# Patient Record
Sex: FEMALE | HISPANIC OR LATINO | Employment: PART TIME | URBAN - METROPOLITAN AREA
[De-identification: names, ages, dates, MRNs, and addresses within clinical notes are randomized per-mention and may not be internally consistent; named-entity substitution may affect disease eponyms.]

---

## 2018-04-16 LAB — HBA1C MFR BLD HPLC: 5.9 %

## 2018-08-08 LAB — HBA1C MFR BLD HPLC: 5.1 %

## 2018-08-27 ENCOUNTER — HOSPITAL ENCOUNTER (EMERGENCY)
Facility: HOSPITAL | Age: 33
Discharge: HOME | End: 2018-08-27
Attending: EMERGENCY MEDICINE

## 2018-08-27 VITALS
TEMPERATURE: 97.3 F | OXYGEN SATURATION: 98 % | BODY MASS INDEX: 25.51 KG/M2 | SYSTOLIC BLOOD PRESSURE: 109 MMHG | HEART RATE: 83 BPM | WEIGHT: 135 LBS | DIASTOLIC BLOOD PRESSURE: 56 MMHG | RESPIRATION RATE: 20 BRPM

## 2018-08-27 DIAGNOSIS — J02.9 PHARYNGITIS: Primary | ICD-10-CM

## 2018-08-27 LAB
ANION GAP SERPL CALCULATED.3IONS-SCNC: 9 MMOL/L (ref 4–13)
BASOPHILS # BLD AUTO: 0.02 THOUSANDS/ΜL (ref 0–0.1)
BASOPHILS NFR BLD AUTO: 0 % (ref 0–1)
BUN SERPL-MCNC: 12 MG/DL (ref 5–25)
CALCIUM SERPL-MCNC: 9 MG/DL (ref 8.3–10.1)
CHLORIDE SERPL-SCNC: 106 MMOL/L (ref 100–108)
CO2 SERPL-SCNC: 25 MMOL/L (ref 21–32)
CREAT SERPL-MCNC: 0.53 MG/DL (ref 0.6–1.3)
EOSINOPHIL # BLD AUTO: 0.04 THOUSAND/ΜL (ref 0–0.61)
EOSINOPHIL NFR BLD AUTO: 1 % (ref 0–6)
ERYTHROCYTE [DISTWIDTH] IN BLOOD BY AUTOMATED COUNT: 12.1 % (ref 11.6–15.1)
EXT PREG TEST URINE: NEGATIVE
GFR SERPL CREATININE-BSD FRML MDRD: 126 ML/MIN/1.73SQ M
GLUCOSE SERPL-MCNC: 118 MG/DL (ref 65–140)
HCT VFR BLD AUTO: 42.2 % (ref 34.8–46.1)
HGB BLD-MCNC: 13.7 G/DL (ref 11.5–15.4)
IMM GRANULOCYTES # BLD AUTO: 0.03 THOUSAND/UL (ref 0–0.2)
IMM GRANULOCYTES NFR BLD AUTO: 0 % (ref 0–2)
LYMPHOCYTES # BLD AUTO: 1.07 THOUSANDS/ΜL (ref 0.6–4.47)
LYMPHOCYTES NFR BLD AUTO: 12 % (ref 14–44)
MCH RBC QN AUTO: 29.2 PG (ref 26.8–34.3)
MCHC RBC AUTO-ENTMCNC: 32.5 G/DL (ref 31.4–37.4)
MCV RBC AUTO: 90 FL (ref 82–98)
MONOCYTES # BLD AUTO: 0.8 THOUSAND/ΜL (ref 0.17–1.22)
MONOCYTES NFR BLD AUTO: 9 % (ref 4–12)
NEUTROPHILS # BLD AUTO: 6.7 THOUSANDS/ΜL (ref 1.85–7.62)
NEUTS SEG NFR BLD AUTO: 78 % (ref 43–75)
NRBC BLD AUTO-RTO: 0 /100 WBCS
PLATELET # BLD AUTO: 254 THOUSANDS/UL (ref 149–390)
PMV BLD AUTO: 9 FL (ref 8.9–12.7)
POTASSIUM SERPL-SCNC: 4 MMOL/L (ref 3.5–5.3)
RBC # BLD AUTO: 4.69 MILLION/UL (ref 3.81–5.12)
SODIUM SERPL-SCNC: 140 MMOL/L (ref 136–145)
T3 SERPL-MCNC: 0.9 NG/ML (ref 0.6–1.8)
T4 FREE SERPL-MCNC: 0.92 NG/DL (ref 0.76–1.46)
TSH SERPL DL<=0.05 MIU/L-ACNC: 1.41 UIU/ML (ref 0.36–3.74)
WBC # BLD AUTO: 8.66 THOUSAND/UL (ref 4.31–10.16)

## 2018-08-27 PROCEDURE — 96361 HYDRATE IV INFUSION ADD-ON: CPT

## 2018-08-27 PROCEDURE — 84480 ASSAY TRIIODOTHYRONINE (T3): CPT | Performed by: EMERGENCY MEDICINE

## 2018-08-27 PROCEDURE — 85025 COMPLETE CBC W/AUTO DIFF WBC: CPT | Performed by: EMERGENCY MEDICINE

## 2018-08-27 PROCEDURE — 84443 ASSAY THYROID STIM HORMONE: CPT | Performed by: EMERGENCY MEDICINE

## 2018-08-27 PROCEDURE — 96374 THER/PROPH/DIAG INJ IV PUSH: CPT

## 2018-08-27 PROCEDURE — 99283 EMERGENCY DEPT VISIT LOW MDM: CPT

## 2018-08-27 PROCEDURE — 94640 AIRWAY INHALATION TREATMENT: CPT

## 2018-08-27 PROCEDURE — 84439 ASSAY OF FREE THYROXINE: CPT | Performed by: EMERGENCY MEDICINE

## 2018-08-27 PROCEDURE — 36415 COLL VENOUS BLD VENIPUNCTURE: CPT | Performed by: EMERGENCY MEDICINE

## 2018-08-27 PROCEDURE — 81025 URINE PREGNANCY TEST: CPT | Performed by: EMERGENCY MEDICINE

## 2018-08-27 PROCEDURE — 80048 BASIC METABOLIC PNL TOTAL CA: CPT | Performed by: EMERGENCY MEDICINE

## 2018-08-27 RX ORDER — IBUPROFEN 600 MG/1
600 TABLET ORAL EVERY 6 HOURS PRN
Qty: 28 TABLET | Refills: 0 | Status: SHIPPED | OUTPATIENT
Start: 2018-08-27 | End: 2018-11-20

## 2018-08-27 RX ORDER — IPRATROPIUM BROMIDE AND ALBUTEROL SULFATE 2.5; .5 MG/3ML; MG/3ML
3 SOLUTION RESPIRATORY (INHALATION) ONCE
Status: COMPLETED | OUTPATIENT
Start: 2018-08-27 | End: 2018-08-27

## 2018-08-27 RX ORDER — KETOROLAC TROMETHAMINE 30 MG/ML
15 INJECTION, SOLUTION INTRAMUSCULAR; INTRAVENOUS ONCE
Status: COMPLETED | OUTPATIENT
Start: 2018-08-27 | End: 2018-08-27

## 2018-08-27 RX ADMIN — SODIUM CHLORIDE 1000 ML: 0.9 INJECTION, SOLUTION INTRAVENOUS at 06:33

## 2018-08-27 RX ADMIN — IPRATROPIUM BROMIDE AND ALBUTEROL SULFATE 3 ML: 2.5; .5 SOLUTION RESPIRATORY (INHALATION) at 06:55

## 2018-08-27 RX ADMIN — KETOROLAC TROMETHAMINE 15 MG: 30 INJECTION, SOLUTION INTRAMUSCULAR at 06:55

## 2018-08-27 NOTE — ED PROVIDER NOTES
History  Chief Complaint   Patient presents with    Sore Throat     Sore throat for 3-4 days  States they are scared because she had thyroid surgery 3-4 months ago at [facility] (states it was thought to be cancer, but was not)      This 36-year-old female complains of progressively worsening sore throat past 4 days  She has subjective fevers  It hurts her to swallow and to take deep breaths  This morning it is much worse  She has had cough productive of yellow and green sputum  There has been no GI or  symptoms  No rash  No trauma  Patient is status post thyroid surgery 4 months ago  None       Past Medical History:   Diagnosis Date    No known problems        Past Surgical History:   Procedure Laterality Date    THYROID SURGERY         No family history on file  I have reviewed and agree with the history as documented  Social History   Substance Use Topics    Smoking status: Never Smoker    Smokeless tobacco: Never Used    Alcohol use No        Review of Systems   Constitutional: Negative  HENT: Positive for sore throat and trouble swallowing  Negative for congestion, ear discharge, ear pain, facial swelling, hearing loss, postnasal drip, rhinorrhea, sinus pain, sinus pressure, tinnitus and voice change  Eyes: Negative  Respiratory: Positive for cough and shortness of breath  Cardiovascular: Negative  Gastrointestinal: Negative  Endocrine: Negative  Genitourinary: Negative  Musculoskeletal: Negative  Skin: Negative  Allergic/Immunologic: Negative  Neurological: Negative  Hematological: Negative  Psychiatric/Behavioral: Negative  All other systems reviewed and are negative  Physical Exam  Physical Exam   Constitutional: She is oriented to person, place, and time  She appears well-developed and well-nourished  She appears distressed ( mild)  HENT:   Head: Normocephalic and atraumatic     Right Ear: External ear normal    Left Ear: External ear normal    Nose: Nose normal    Mouth/Throat: Oropharynx is clear and moist    Eyes: Conjunctivae and EOM are normal  Pupils are equal, round, and reactive to light  Neck: Normal range of motion  Neck supple  No JVD present  The appears to be some fullness overlying the thyroid gland  There is tenderness overlying the thyroid  No erythema, induration or fluctuance  Cardiovascular: Normal rate and regular rhythm  No murmur heard  Pulmonary/Chest: Effort normal and breath sounds normal  No stridor  Abdominal: Soft  Bowel sounds are normal    Musculoskeletal: Normal range of motion  She exhibits no edema  Neurological: She is alert and oriented to person, place, and time  She has normal reflexes  No cranial nerve deficit  Coordination normal    Skin: Skin is warm and dry  No rash noted  She is not diaphoretic  Psychiatric:   Anxious   Nursing note and vitals reviewed        Vital Signs  ED Triage Vitals   Temperature Pulse Respirations Blood Pressure SpO2   08/27/18 0542 08/27/18 0542 08/27/18 0542 08/27/18 0542 08/27/18 0542   (!) 97 3 °F (36 3 °C) 69 15 110/68 100 %      Temp src Heart Rate Source Patient Position - Orthostatic VS BP Location FiO2 (%)   -- 08/27/18 0542 08/27/18 0715 08/27/18 0542 --    Monitor Lying Right arm       Pain Score       08/27/18 0542       7           Vitals:    08/27/18 0700 08/27/18 0715 08/27/18 0730 08/27/18 0745   BP: 94/53 110/57 111/58 109/56   Pulse: 66 73 79 83   Patient Position - Orthostatic VS:  Lying Lying Lying       Visual Acuity      ED Medications  Medications   sodium chloride 0 9 % bolus 1,000 mL (0 mL Intravenous Stopped 8/27/18 0755)   ipratropium-albuterol (DUO-NEB) 0 5-2 5 mg/3 mL inhalation solution 3 mL (3 mL Nebulization Given 8/27/18 0655)   ketorolac (TORADOL) injection 15 mg (15 mg Intravenous Given 8/27/18 0655)       Diagnostic Studies  Results Reviewed     Procedure Component Value Units Date/Time    T3 [[id number]]  (Normal) Collected:  08/27/18 0521    Lab Status:  Final result Specimen:  Blood from Arm, Left Updated:  08/27/18 1129     T3, Total 0 90 ng/mL     T4, free [[id number]]  (Normal) Collected:  08/27/18 6912    Lab Status:  Final result Specimen:  Blood from Arm, Left Updated:  08/27/18 1116     Free T4 0 92 ng/dL     Basic metabolic panel [[id number]]  (Abnormal) Collected:  08/27/18 1459    Lab Status:  Final result Specimen:  Blood from Arm, Left Updated:  08/27/18 0736     Sodium 140 mmol/L      Potassium 4 0 mmol/L      Chloride 106 mmol/L      CO2 25 mmol/L      ANION GAP 9 mmol/L      BUN 12 mg/dL      Creatinine 0 53 (L) mg/dL      Glucose 118 mg/dL      Calcium 9 0 mg/dL      eGFR 126 ml/min/1 73sq m     Narrative:         National Kidney Disease Education Program recommendations are as follows:  GFR calculation is accurate only with a steady state creatinine  Chronic Kidney disease less than 60 ml/min/1 73 sq  meters  Kidney failure less than 15 ml/min/1 73 sq  meters  TSH [[id number]]  (Normal) Collected:  08/27/18 9952    Lab Status:  Final result Specimen:  Blood from Arm, Left Updated:  08/27/18 0717     TSH 3RD GENERATON 1 407 uIU/mL     Narrative:         Patients undergoing fluorescein dye angiography may retain small amounts of fluorescein in the body for 48-72 hours post procedure  Samples containing fluorescein can produce falsely depressed TSH values  If the patient had this procedure,a specimen should be resubmitted post fluorescein clearance            The recommended reference ranges for TSH during pregnancy are as follows:  First trimester 0 1 to 2 5 uIU/mL  Second trimester  0 2 to 3 0 uIU/mL  Third trimester 0 3 to 3 0 uIU/m      CBC and differential [[id number]]  (Abnormal) Collected:  08/27/18 0632    Lab Status:  Final result Specimen:  Blood from Arm, Left Updated:  08/27/18 0644     WBC 8 66 Thousand/uL      RBC 4 69 Million/uL      Hemoglobin 13 7 g/dL      Hematocrit 42 2 %      MCV 90 fL      MCH 29 2 pg      MCHC 32 5 g/dL      RDW 12 1 %      MPV 9 0 fL      Platelets 761 Thousands/uL      nRBC 0 /100 WBCs      Neutrophils Relative 78 (H) %      Immat GRANS % 0 %      Lymphocytes Relative 12 (L) %      Monocytes Relative 9 %      Eosinophils Relative 1 %      Basophils Relative 0 %      Neutrophils Absolute 6 70 Thousands/µL      Immature Grans Absolute 0 03 Thousand/uL      Lymphocytes Absolute 1 07 Thousands/µL      Monocytes Absolute 0 80 Thousand/µL      Eosinophils Absolute 0 04 Thousand/µL      Basophils Absolute 0 02 Thousands/µL     POCT pregnancy, urine [[id number]]  (Normal) Resulted:  08/27/18 0627    Lab Status:  Final result Updated:  08/27/18 0627     EXT PREG TEST UR (Ref: Negative) Negative                 No orders to display              Procedures  Procedures       Phone Contacts  ED Phone Contact    ED Course   Signed out to Dr Pat Hayes at 7 AM                             MDM  CritCare Time    Disposition  Final diagnoses:   Pharyngitis     Time reflects when diagnosis was documented in both MDM as applicable and the Disposition within this note     Time User Action Codes Description Comment    8/27/2018  7:36 AM Radha Wilson [J02 9] Pharyngitis       ED Disposition     ED Disposition Condition Comment    Discharge  Bassam Dwight discharge to home/self care      Condition at discharge: Good        Follow-up Information     Follow up With Specialties Details Why Contact Info Additional [address] [facility] Emergency Department Emergency Medicine  As needed, If symptoms worsen [address] ED, [address], [address] [facility]  Call in 1 day to get a family doctor            Discharge Medication List as of 8/27/2018  7:37 AM      START taking these medications    Details   ibuprofen (MOTRIN) 600 mg tablet Take 1 tablet (600 mg total) by mouth every 6 (six) hours as needed for mild pain for up to 7 days, Starting Mon 8/27/2018, Until Mon 9/3/2018, Print           No discharge procedures on file      ED Provider  Electronically Signed by           Farida Hoskins DO  08/29/18 7388

## 2018-08-27 NOTE — ED CARE HANDOFF
Emergency Department Sign Out Note        Sign out and transfer of care from [facility]  See Separate Emergency Department note  The patient, Marin Ferrari, was evaluated by the previous provider for sore throat  Workup Completed:  Patient examined  Waiting for blood work    ED Course / Workup Pending (followup):   waiting for blood work     patient has sore throat  Four days  Cough  No chest pain or shortness of breath  No nausea or vomiting  No fevers  Does have pharyngeal erythema  History of thyroid biopsy 2 months ago  Told it is normal   No erythema or swelling noted in neck  Airway patent  No trismus  Tolerating secretions  Pain improved with Toradol  Procedures  MDM  CritCare Time      Disposition  Final diagnoses:   Pharyngitis     Time reflects when diagnosis was documented in both MDM as applicable and the Disposition within this note     Time User Action Codes Description Comment    8/27/2018  7:36 AM Wray Carrel, Ara Add [J02 9] Pharyngitis       ED Disposition     ED Disposition Condition Comment    Discharge  Marin Ferrari discharge to home/self care  Condition at discharge: Good        Follow-up Information     Follow up With Specialties Details Why Contact Info Additional [address] [facility] Emergency Department Emergency Medicine  As needed, If symptoms worsen 450 [facility]  [address] [place] [address] [address] ED, [facility], [facility], [address] [facility], [address] [facility]  Call in 1 day to get a family doctor          Discharge Medication List as of 8/27/2018  7:37 AM      START taking these medications    Details   ibuprofen (MOTRIN) 600 mg tablet Take 1 tablet (600 mg total) by mouth every 6 (six) hours as needed for mild pain for up to 7 days, Starting Mon 8/27/2018, Until Mon 9/3/2018, Print           No discharge procedures on file         ED Provider  Electronically Signed by     Armando Pina MD  08/28/18 2687

## 2018-08-27 NOTE — DISCHARGE INSTRUCTIONS

## 2018-08-28 ENCOUNTER — APPOINTMENT (EMERGENCY)
Dept: RADIOLOGY | Facility: HOSPITAL | Age: 33
End: 2018-08-28

## 2018-08-28 ENCOUNTER — HOSPITAL ENCOUNTER (EMERGENCY)
Facility: HOSPITAL | Age: 33
Discharge: HOME | End: 2018-08-28
Attending: EMERGENCY MEDICINE | Admitting: EMERGENCY MEDICINE

## 2018-08-28 VITALS
BODY MASS INDEX: 23.6 KG/M2 | SYSTOLIC BLOOD PRESSURE: 115 MMHG | HEART RATE: 78 BPM | RESPIRATION RATE: 19 BRPM | TEMPERATURE: 97.1 F | DIASTOLIC BLOOD PRESSURE: 55 MMHG | WEIGHT: 125 LBS | OXYGEN SATURATION: 98 % | HEIGHT: 61 IN

## 2018-08-28 DIAGNOSIS — K12.0 CANKER SORES ORAL: ICD-10-CM

## 2018-08-28 DIAGNOSIS — J02.9 PHARYNGITIS: Primary | ICD-10-CM

## 2018-08-28 DIAGNOSIS — R05.9 COUGH: ICD-10-CM

## 2018-08-28 PROCEDURE — 96372 THER/PROPH/DIAG INJ SC/IM: CPT

## 2018-08-28 PROCEDURE — 71046 X-RAY EXAM CHEST 2 VIEWS: CPT

## 2018-08-28 PROCEDURE — 99283 EMERGENCY DEPT VISIT LOW MDM: CPT

## 2018-08-28 PROCEDURE — 70360 X-RAY EXAM OF NECK: CPT

## 2018-08-28 RX ORDER — KETOROLAC TROMETHAMINE 30 MG/ML
15 INJECTION, SOLUTION INTRAMUSCULAR; INTRAVENOUS ONCE
Status: COMPLETED | OUTPATIENT
Start: 2018-08-28 | End: 2018-08-28

## 2018-08-28 RX ADMIN — KETOROLAC TROMETHAMINE 15 MG: 30 INJECTION, SOLUTION INTRAMUSCULAR; INTRAVENOUS at 04:11

## 2018-08-28 RX ADMIN — DEXAMETHASONE SODIUM PHOSPHATE 10 MG: 10 INJECTION, SOLUTION INTRAMUSCULAR; INTRAVENOUS at 04:12

## 2018-08-28 NOTE — DISCHARGE INSTRUCTIONS
Úlceras aftosas   LO QUE NECESITA SABER:   Úlceras aftosas o aftas son pequeñas úlceras que crecen dentro de delaney boca  Ulceras son heridas abiertas que pueden ser superficiales o profundas  Usted puede tener nicholas o mas aftas al [facility], y estas pueden crecer en grupos  INSTRUCCIONES SOBRE EL LEAH HOSPITALARIA:   Busque atención médica de inmediato si:   · Si usted no puede comer o beber por el dolor en delaney boca  Pregúntele a delaney Radha Punch vitaminas y minerales son adecuados para usted  · Queta úlceras aftosas no anguiano desaparecido después de 3 a 4 semanas  · Delaney dolor no desaparece después de lisette medicamentos  · Queta úlceras empeoran o usted esta desarrollando mas úlceras, aun después del tratamiento  · Usted tiene preguntas o inquietudes acerca de delaney condición o cuidado  Medicamentos:  Es posible que usted necesite alguno de los siguientes:  · Los analgésicos  pueden administrarse en crema, gel o enjuague bucal     · Medicamentos esteroideos  pueden administrarse para reducir el enrojecimiento, el dolor y la hinchazón  · [place] queta medicamentos russell se le haya indicado  Consulte con delaney médico si usted tavon que delaney medicamento no le está ayudando o si presenta efectos secundarios  Infórmele si es alérgico a cualquier medicamento  Mantenga nicholas lista actualizada de los [place], las vitaminas y los productos herbales que la nena  Incluya los siguientes datos de los medicamentos: cantidad, frecuencia y motivo de administración  Traiga con usted la lista o los envases de la píldoras a queta citas de seguimiento  Lleve la lista de los medicamentos con usted en adam de nicholas emergencia  Acuda a queta consultas de control con delaney médico según le indicaron  Anote queta preguntas para que se acuerde de hacerlas ariel queta visitas  Coma alimentos blandos y simples hasta que sanen las úlceras aftosas  Por ejemplo, yogur, huevos y sopas cremosas  Puede que necesite cambiar algunos alimentos que normalmente come  No coma alimentos crujientes, secos o salados, tales russell pan herson seco, palomitas de maíz o chips  Estos pueden causarle dolor  No consuma alimentos o bebidas que tengan cítricos russell toronjas, jugo de [place], jennifer y shelia  Estos alimentos pueden empeorar el dolor o causar la formación de [place]  Cuidado bucal:  cuidadosamente cepille queta dientes y gilbert lengua todos los días  Use un cepillo de dientes suave  Si usted tiene dentaduras, [employer]  Si queta lanie o dentaduras no se sienten cómodos, jocelyn que Brian Christy revise para asegurarse que tengan el ajuste correcto  © 2017 2600 Justin Gardiner Information is for End User's use only and may not be sold, redistributed or otherwise used for commercial purposes  All illustrations and images included in CareNotes® are the copyrighted property of A D A M , Inc  or Chris Edge  Esta información es sólo para uso en educación  Gilbert intención no es darle un consejo médico sobre enfermedades o tratamientos  Colsulte con gilbert [place] farmacéutico antes de seguir cualquier régimen médico para saber si es seguro y efectivo para usted  Tos aguda   LO QUE NECESITA SABER:   Fern Raphael tos aguda puede durar hasta 3 semanas  Las causas comunes de nicholas tos aguda incluyen un resfriado, alergias o nicholas infección pulmonar  INSTRUCCIONES SOBRE EL LEAH HOSPITALARIA:   Regrese a la espinoza de emergencias si:   · Tiene dificultad para respirar o le falta el aire  · Usted tose nahomi o nota nahomi en gilbert mucosidad  · Se desmaya, se siente débil o mareado  · Le duele el pecho cuando respira hondo o tose  · Tiene respiración silbante  Pregúntele a gilbert Melissa Byes vitaminas y minerales son adecuados para usted  · Usted tiene fiebre  · La tos dura más de 4 semanas  · Queta síntomas no mejoran con el tratamiento  · Usted tiene preguntas o inquietudes acerca de gilbert condición o cuidado    Medicamentos:   · Medicamentos, para detener la tos, disminuir la inflamación de las vías respiratorias o ayudar a abrirlas  Los medicamentos también pueden despejar las vías respiratorias  Pregunte a delaney médico qué medicamentos de venta tez puede lisette  Si tiene nicholas infección causada por bacterias, es posible que necesite antibióticos  · [place] randall medicamentos russell se le haya indicado  Consulte con delaney médico si usted tavon que delaney medicamento no le está ayudando o si presenta efectos secundarios  Infórmele si es alérgico a cualquier medicamento  Mantenga nicholas lista actualizada de los [employer], las vitaminas y los productos herbales que la nena  Incluya los siguientes datos de los medicamentos: cantidad, frecuencia y motivo de administración  Traiga con usted la lista o los envases de la píldoras a randall citas de seguimiento  Lleve la lista de los medicamentos con usted en adam de nicholas emergencia  El manejo de delaney síntomas:   · No fume y permanezca lejos de otras personas que fuman  La nicotina y otros químicos contenidos en los cigarrillos y puros pueden causar daño pulmonar y empeorar delaney tos  Pida información a delaney médico si usted actualmente fuma y necesita ayuda para dejar de fumar  Los cigarrillos electrónicos o tabaco sin humo todavía contienen nicotina  Consulte con delaney médico antes de [employer]  · [place] líquidos adicionales russell se le indique  Los líquidos le ayudarán a aflojar y disolver la mucosidad para que la pueda expectorar  Los líquidos ayudarán a evitar la deshidratación  Los mejores líquidos para lisette son el agua, el jugo y el caldo  No tome líquidos que contienen cafeína  La cafeína puede aumentar el riesgo de deshidratación  Pregúntele a delaney médico cuál es la cantidad de líquido que necesita ingerir a diario  · Repose según le indicaron  No realice actividades que empeoren la tos, russell el ejercicio físico      · Use un humidificador o vaporizador    Use un humidificador de isabelle frío o un vaporizador para elevar la humedad en gilbert casa  [place] podría ayudarle a respirar más fácilmente y al mismo tiempo disminuir la tos  · Ingiera de 2 a 5 ml de [employer] al día  La miel puede ayudar a diluir los mocos y Vill[employer] tos  · Utilice gotas o pastillas para la tos  Estas pueden ayudar a disminuir la irritación de la garganta y la tos  Acuda a randall consultas de control con gilbert médico según le indicaron  Anote randall preguntas para que se acuerde de hacerlas ariel randall visitas  © 2017 2600 Justin Gardiner Information is for End User's use only and may not be sold, redistributed or otherwise used for commercial purposes  All illustrations and images included in CareNotes® are the copyrighted property of A D A M , Inc  or Chris Edge  Esta información es sólo para uso en educación  Gilbert intención no es darle un consejo médico sobre enfermedades o tratamientos  Colsulte con gilbert [place] farmacéutico antes de seguir cualquier régimen médico para saber si es seguro y efectivo para usted  Faringitis   LO QUE USTED DEBE SABER:   La faringitis es la inflamación de la parte de adentro de la garganta, conocida russell faringe  La faringitis podría causar dolor de garganta o irritación cuando usted traga y es generalmente causada por un resfriado o el virus de la gripe  Podría también ser causada por bacterias, russell por ejemplo la bacteria del estreptococo         DESPUÉS DE SER DADO DE LEAH:   Medicamentos:  Gilbert dolor de garganta se aliviará en aproximadamente de 3 a 5 días si no la nena tratamiento siempre y cuando un virus lo haya causado  Usted podría necesitar de lo siguiente:  · Antibióticos:  Los antibióticos ayudan a combatir o prevenir infecciones causadas por bacterias  No suspenda el uso de Waldemar a menos que gilbert médico de cabecera se lo indique, aunque ya se esté sintiendo mejor   Gilbert garganta se empezará a sentir mejor dentro de 5 días después de comenzar con el tratamiento de antibióticos  · Ibuprofeno o acetaminofeno:  Estos medicamentos disminuyen el dolor y la fiebre y se pueden comprar sin receta médica  Pregunte a delaney médico de cabecera cuáles medicamentos son mejores para usted y pregunte la cantidad y frecuencia para los mismos  · [place] randall medicamentos russell se le haya indicado  Llame a delaney proveedor de mynor si usted piensa que delaney medicamento no le está ayudando o si tiene efectos secundarios  Infórmele si es alérgico a cualquier medicamento  Mantenga nicholas lista vigente de los medicamentos, vitaminas, y hierbas que la nena  [address] cantidades, la frecuencia con que los la nena y por qué los la nena  Traiga la lista o los envases de randall medicamentos a las citas de seguimiento  Mantenga la lista consigo en adam de nicholas emergencia  Bote las listas [address]  Programe nicholas renato con delaney proveedor de [employer] se le haya indicado: Anote randall preguntas para que se acuerde de hacerlas ariel randall visitas  Cuidados personales:   · Jocelyn gárgaras de agua con sal:  Mezcle ¼ de cucharadita de sal en un vaso con agua tibia y jocelyn gárgaras  [place] podría ayudar a reducir la inflamación en delaney garganta  · Caitie Brazen líquidos:  Las bebidas frías o tibias podrían ayudarle a aliviar delaney dolor de garganta, además de que beber líquidos puede ayudar a prevenir la deshidratación  · Mantenga delaney habitación húmeda:  Use un humidificador de vapor frío para ayudar a humedecer el aire en delaney habitación y calmar delaney tos  · Alivie delaney garganta:  Las pastillas para la tos, el hielo y los alimentos blandos o helados de agua podrían ayudar a aliviar delaney garganta  · Descanse delaney garganta lo más que pueda:  Trate de no usar delaney voz  [place] podría irritar delaney garganta y empeorar randall síntomas  · Evite el contagio de gérmenes:  La faringitis se propaga de Carren Quince persona a otra con facilidad  Lávese las og con agua tibia y [place]  No comparta comidas ni bebidas   Estas prácticas ayudarán a prevenir el contagio de gérmenes  Comuníquese con delaney médico de cabecera si:   · Delaney dolor o irritación en la garganta Antolin Eric  · Queta síntomas no mejoran después de 5 días  · Tiene preguntas o inquietudes sobre delaney condición o cuidados  Busque ayuda inmediata o llame al 911 si:   · Usted tiene dificultad para respirar o tragar porque delaney garganta está inflamada o adolorida  · Usted está babeando porque le duele demasiado tragar  · Usted tiene un bulto doloroso en la garganta que no se le callie después de 5 días  · Usted tiene fiebre de [place] de 102? F (39?C) o le dura más de 3 días  [place] podría ser un signo de nicholas infección aún más seria  · Usted se siente confundido  · Usted tiene nahomi en la garganta u [place]  © 2014 3801 Radha Mackeye is for End User's use only and may not be sold, redistributed or otherwise used for commercial purposes  All illustrations and images included in CareNotes® are the copyrighted property of A D A M , Inc  or Chris Edge  Esta información es sólo para uso en educación  Delaney intención no es darle un consejo médico sobre enfermedades o tratamientos  Colsulte con delaney Yaima Boers farmacéutico antes de seguir cualquier régimen médico para saber si es seguro y efectivo para usted

## 2018-08-28 NOTE — ED PROVIDER NOTES
History  Chief Complaint   Patient presents with    Sore Throat     PT presents to the er with a sore throat Pt  Pt states that since being seen here yesterday that ehr pain has gotten worse and thta she can not swallow  Pt seen here yesterday for same (nonproductive cough x 4 days), sore throat, ulcer in mouth  No fevers, dyspnea or CP  No rashes  No abd pain/N/V  No urinary complaints  No recent travel  Recent thyroid surgery - no CA  History provided by:  Patient and significant other   used: Yes    Sore Throat   Location:  Generalized  Quality:  Aching  Severity:  Moderate  Onset quality:  Gradual  Duration:  4 days  Timing:  Constant  Progression:  Unchanged  Chronicity:  New  Relieved by:  Nothing  Worsened by:  Swallowing  Ineffective treatments:  NSAIDs  Associated symptoms: adenopathy, cough and trouble swallowing    Associated symptoms: no abdominal pain, no chest pain, no chills, no drooling, no ear pain, no fever, no headaches, no neck stiffness, no rash, no rhinorrhea, no shortness of breath, no sinus congestion, no stridor and no voice change    Risk factors: recent ENT procedure        Prior to Admission Medications   Prescriptions Last Dose Informant Patient Reported? Taking?   ibuprofen (MOTRIN) 600 mg tablet   No No   Sig: Take 1 tablet (600 mg total) by mouth every 6 (six) hours as needed for mild pain for up to 7 days      Facility-Administered Medications: None       Past Medical History:   Diagnosis Date    No known problems        Past Surgical History:   Procedure Laterality Date    THYROID SURGERY         History reviewed  No pertinent family history  I have reviewed and agree with the history as documented  Social History   Substance Use Topics    Smoking status: Never Smoker    Smokeless tobacco: Never Used    Alcohol use No        Review of Systems   Constitutional: Negative for appetite change, chills, fatigue and fever     HENT: Positive for mouth sores, sore throat and trouble swallowing  Negative for congestion, drooling, ear pain, rhinorrhea and voice change  Eyes: Negative for pain and visual disturbance  Respiratory: Positive for cough  Negative for chest tightness, shortness of breath and stridor  Cardiovascular: Negative for chest pain, palpitations and leg swelling  Gastrointestinal: Negative for abdominal pain, blood in stool, constipation, diarrhea, nausea and vomiting  Genitourinary: Negative for difficulty urinating and hematuria  Musculoskeletal: Negative for back pain, neck pain and neck stiffness  Skin: Negative for rash  Neurological: Negative for dizziness, syncope, speech difficulty, light-headedness and headaches  Hematological: Positive for adenopathy  Psychiatric/Behavioral: Negative for confusion and suicidal ideas  Physical Exam  Physical Exam   Constitutional: She is oriented to person, place, and time  She appears well-developed and well-nourished  No distress  HENT:   Head: Normocephalic and atraumatic  Right Ear: External ear normal    Left Ear: External ear normal    Nose: Nose normal    Mouth/Throat: Uvula is midline and mucous membranes are normal  Oral lesions (apthous ulcer on bottom gums) present  No trismus in the jaw  No dental abscesses or uvula swelling  Posterior oropharyngeal erythema (mild) present  No oropharyngeal exudate, posterior oropharyngeal edema or tonsillar abscesses  No tonsillar exudate  Eyes: Conjunctivae and EOM are normal  Pupils are equal, round, and reactive to light  Right eye exhibits no discharge  Left eye exhibits no discharge  No scleral icterus  Neck: Normal range of motion  Neck supple  No tracheal deviation present  Thyromegaly (with mild tenderness  no erythema  incision well healed) present  Cardiovascular: Normal rate, regular rhythm, normal heart sounds and intact distal pulses  Exam reveals no gallop and no friction rub      No murmur heard   Pulmonary/Chest: Effort normal and breath sounds normal  No stridor  No respiratory distress  She exhibits no tenderness  Abdominal: Soft  Bowel sounds are normal  There is no tenderness  There is no rebound and no guarding  Musculoskeletal: Normal range of motion  She exhibits no edema or deformity  Lymphadenopathy:     She has no cervical adenopathy  Neurological: She is alert and oriented to person, place, and time  No cranial nerve deficit or sensory deficit  Coordination normal    Skin: Skin is warm and dry  No rash noted  She is not diaphoretic  Psychiatric: She has a normal mood and affect  Her behavior is normal    Nursing note and vitals reviewed  Vital Signs  ED Triage Vitals   Temperature Pulse Respirations Blood Pressure SpO2   08/28/18 0323 08/28/18 0323 08/28/18 0323 08/28/18 0323 08/28/18 0323   (!) 97 1 °F (36 2 °C) 78 19 115/55 98 %      Temp Source Heart Rate Source Patient Position - Orthostatic VS BP Location FiO2 (%)   08/28/18 0323 08/28/18 0323 08/28/18 0323 08/28/18 0323 --   Tympanic Monitor Sitting Right arm       Pain Score       08/28/18 0411       9           Vitals:    08/28/18 0323   BP: 115/55   Pulse: 78   Patient Position - Orthostatic VS: Sitting       Visual Acuity      ED Medications  Medications   dexamethasone 10 mg/mL oral liquid 10 mg 1 mL (10 mg Oral Given 8/28/18 0412)   ketorolac (TORADOL) injection 15 mg (15 mg Intramuscular Given 8/28/18 0411)       Diagnostic Studies  Results Reviewed     None                 XR neck soft tissue   ED Interpretation by Lyric Jasso MD (08/28 0456)   No acute findings  Normal prevertebral spaces  XR chest 2 views   ED Interpretation by Lyric Jasso MD (08/28 0456)   No consolidation, PTX, PNA or subq air                   Procedures  Procedures       Phone Contacts  ED Phone Contact    ED Course  ED Course as of Aug 28 0500   Tue Aug 28, 2018   0320 Neg preg test yesterday    0400 Pt in no distress  No stridor, tripoding or drooling  No voice change  Will check plain films, give pain meds  Suspect viral pharyngitis, but will r/o obstructive etiology from thyroid, PNA, pleural effusion, PTX, retropharyngeal abscess  [address] noted  Will d/c pt home, recommend f/u with PCP  If worsening, drooling, not tolerating liquids - RTED  MDM  Number of Diagnoses or Management Options  Canker sores oral: new and does not require workup  Cough: new and requires workup  Pharyngitis: established and worsening     Amount and/or Complexity of Data Reviewed  Tests in the radiology section of CPT®: ordered and reviewed  Review and summarize past medical records: yes  Independent visualization of images, tracings, or specimens: yes    Risk of Complications, Morbidity, and/or Mortality  Presenting problems: moderate  Diagnostic procedures: low  Management options: low    Patient Progress  Patient progress: improved    CritCare Time    Disposition  Final diagnoses:   Pharyngitis   Cough   Canker sores oral     Time reflects when diagnosis was documented in both MDM as applicable and the Disposition within this note     Time User Action Codes Description Comment    8/28/2018  4:57 AM Rc Li S Add [J02 9] Pharyngitis     8/28/2018  4:57 AM Rc Li S Add [R05] Cough     8/28/2018  4:59 AM Alcides Botello Add [K12 0] Canker sores oral       ED Disposition     ED Disposition Condition Comment    Discharge  Unk Poag discharge to home/self care      Condition at discharge: Good        Follow-up Information     Follow up With Specialties Details Why Contact Info Additional Information    [facility] Emergency Department Emergency Medicine  If symptoms worsen [address] [facility]  [address] [place] [address] [address] ED, [address], [address]          Patient's Medications   Discharge Prescriptions    No medications on file     No discharge procedures on file      ED Provider  Electronically Signed by           Lyric Jasso MD  08/28/18 9073

## 2018-08-28 NOTE — PROGRESS NOTES
Called patient about cxr  She is Turkish speaking only, DR Garcia was able to communicate with her and advised her to start amoxicillin and zpak for pneumonia  I called in these scripts to Jonn Gardiner in [facility]

## 2018-11-06 ENCOUNTER — OFFICE VISIT (OUTPATIENT)
Dept: OBGYN CLINIC | Facility: HOSPITAL | Age: 33
End: 2018-11-06

## 2018-11-06 VITALS
SYSTOLIC BLOOD PRESSURE: 114 MMHG | DIASTOLIC BLOOD PRESSURE: 71 MMHG | HEART RATE: 66 BPM | BODY MASS INDEX: 27.09 KG/M2 | WEIGHT: 138 LBS | HEIGHT: 60 IN

## 2018-11-06 DIAGNOSIS — N91.2 AMENORRHEA: Primary | ICD-10-CM

## 2018-11-06 PROCEDURE — 99203 OFFICE O/P NEW LOW 30 MIN: CPT | Performed by: OBSTETRICS & GYNECOLOGY

## 2018-11-06 NOTE — PROGRESS NOTES
Note on use of High Level Disinfection Process (Trophon) for transvaginal probe:   Tye Bell   REF: [id number]   SN: [id number]     [address] [place] [address] [place] #[id number]

## 2018-11-06 NOTE — PROGRESS NOTES
Assessment  29 y o  female at approximately 6wks presenting for amenorrhea  Pregnancy confirmed, dating inconsistent with LMP  MERCY will be confirmed at next US, given lack of fetal pole on this ultrasound  Plan  Diagnoses and all orders for this visit:    Amenorrhea  - Prenatal vitamin  - Prenatal Nursing Intake in 2 weeks  - Prenatal H&P in 4 weeks     ____________________________________________________________      Diya Meadows is a 29 y o  female with an LMP of 8/26/18 (10w2d by LMP) who presents for amenorrhea  She notes she took a home pregnancy test in October and it was positive  She is currently otherwise without complaint  Patient notes that this pregnancy was planned  She was not using contraception at the time  She reports she is certain of her LMP and that she has regular menses  She has had a little spotting x2 d at the end of September  Unsure if this was a regular menses  Patient's prior pregnancies were complicated by gestational diabetes  Objective  /71   Pulse 66   Ht 5' (1 524 m)   Wt 62 6 kg (138 lb)   BMI 26 95 kg/m²       Physical Exam:  Physical Exam   Constitutional: She appears well-developed and well-nourished  No distress  HENT:   Head: Normocephalic and atraumatic  Eyes: No scleral icterus  Pulmonary/Chest: Effort normal  No accessory muscle usage  No respiratory distress  Abdominal: She exhibits no distension  There is no tenderness  There is no rebound and no guarding  Skin: Skin is warm and dry  No rash noted  No erythema  Psychiatric: She has a normal mood and affect   Her behavior is normal        Transvaginal Pelvic Ultrasound  +IUP  Fetal pole not visualized  GS consistent with 5w5d (1 21 x 0 98 x 2 14cm)  +yolk sac  Fibroid noted on anterior uterine wall (5 25 x 5 17 x 3 52cm)

## 2018-11-12 LAB — HBA1C MFR BLD HPLC: 5.3 %

## 2018-11-20 ENCOUNTER — INITIAL PRENATAL (OUTPATIENT)
Dept: OBGYN CLINIC | Facility: HOSPITAL | Age: 33
End: 2018-11-20

## 2018-11-20 ENCOUNTER — PATIENT OUTREACH (OUTPATIENT)
Dept: OBGYN CLINIC | Facility: HOSPITAL | Age: 33
End: 2018-11-20

## 2018-11-20 VITALS
WEIGHT: 137.8 LBS | HEIGHT: 60 IN | SYSTOLIC BLOOD PRESSURE: 108 MMHG | HEART RATE: 72 BPM | BODY MASS INDEX: 27.05 KG/M2 | DIASTOLIC BLOOD PRESSURE: 74 MMHG

## 2018-11-20 DIAGNOSIS — Z34.91 PREGNANT AND NOT YET DELIVERED IN FIRST TRIMESTER: ICD-10-CM

## 2018-11-20 DIAGNOSIS — F41.9 ANXIETY DURING PREGNANCY IN FIRST TRIMESTER, ANTEPARTUM: ICD-10-CM

## 2018-11-20 DIAGNOSIS — Z86.59 HISTORY OF DEPRESSION: ICD-10-CM

## 2018-11-20 DIAGNOSIS — Z3A.01 LESS THAN 8 WEEKS GESTATION OF PREGNANCY: Primary | ICD-10-CM

## 2018-11-20 DIAGNOSIS — O21.9 NAUSEA AND VOMITING IN PREGNANCY: ICD-10-CM

## 2018-11-20 DIAGNOSIS — Z86.32 HISTORY OF GESTATIONAL DIABETES: ICD-10-CM

## 2018-11-20 DIAGNOSIS — O09.291 PRIOR PREGNANCY WITH FETAL DEMISE AND CURRENT PREGNANCY IN FIRST TRIMESTER: ICD-10-CM

## 2018-11-20 DIAGNOSIS — O99.341 ANXIETY DURING PREGNANCY IN FIRST TRIMESTER, ANTEPARTUM: ICD-10-CM

## 2018-11-20 PROCEDURE — 99211 OFF/OP EST MAY X REQ PHY/QHP: CPT

## 2018-11-20 NOTE — PATIENT INSTRUCTIONS
Señales ariel el embarazo: Cuando llamar    1  sangrado vaginal  2  Dolor abdominal ciara que no desaparece  3  Fiebre (más de 100 4 y no se brice con Tylenol)  4  Vómitos persistentes que tavarez más de 24 horas  5  dolor de pecho  6  Dolor o ardor al orinar  7  Dolor de patti severo que no se resuelve con Tylenol  8  Visión borrosa o mary anne puntos en delaney visión  9  Hinchazón repentina de delaney tyra o og  10  Enrojecimiento, hinchazón o dolor en nicholas pierna  11  Un aumento de peso repentino en pocos días  12  Contar los movimientos fetales del bebé  (después de 28 semanas o el sexto mes de embarazo)  15  Nicholas pérdida de líquido acuoso de la vagina: puede ser un chorro, un goteo o nicholas humedad continua  14  Después de 20 semanas de embarazo, calambres rítmicos (más de 4 por hora) o menstruales russell dolor Laura Furrow / [place] y Embarazo:  La siguiente lista de medicamentos de Nikko Gins generalmente se considera murray de lisette ariel el [facility]  Tenga cuidado de no duplicar los productos que contienen acetaminofén (Tylenol)  Resfriados / dolor de garganta   Robitussin DM - Simple (guaifenesina)   Aerosol nasal salino   Gárgaras de agua salada caliente   Cepacol pastillas para la garganta o enjuague bucal (cetilpiridinio)   Sucrets (hexylresoricinol)    Patience Couch LA D - o DESCONGESANTE   Claritin (loratadine)   Zrytec (cetirizina)   Allerga (fexofenadina)      Gracy de [place] / [place] y molestias:   Tylenol (paracetamol) (acetaminophen)  NO exceda más de 3000 mg de Tylenol en un período de 24 horas        Acidez   Mylanta (hidróxido de aluminio / simeticona, hidróxido de magnesio)   Maalaox (hidróxido de aluminio y [place], hidróxido de magnesio)   Tums (carbonato de calcio)   Riopan (magaldrate)      Estreñimiento   Colace (docusato de sodio)   Surfak (docusato de sodio)   MiraLAX   Supositorios de glicerina   Flota enema (fosfato de sodio y bifosfato de sodio)  Náuseas vómitos   Vitamina B6 (piridoxina): puede lisette 50 mg a la hora de acostarse, 25 mg por la mañana, 25 mg por la tarde   Unisom (doxilamina): se puede usar para las náuseas / vómitos (ayala nicholas tableta de 25 mg por la mitad)  Puede causar somnolencia  Dormir   Benadryl (difenhidramina): tome 1-2 tabletas según sea necesario antes de acostarse   Tableta Unisom (doxilamina) de 25 mg    Tableta de melatonina de 5 mg: según sea necesario antes de acostarse      En general, la forma genérica de la medicina suele ser más económica que la forma de zuleika del [employer]  Shelvy Mingle Un embarazo,   CUIDADO AMBULATORIO:   Lo qué necesita saber sobre el Grace Zina: Un embarazo normal dura alrededor de 40 semanas  El primer trimestre dura desde delaney último periodo hasta la semana 12 de Grace Zina  El hanny trimestre se extiende desde la semana 13 de delaney embarazo hasta la semana 23  El tercer trimestre se extiende desde la semana 24 de embarazo hasta que nazca delaney bebé  Si usted conoce la fecha de delaney último periodo, delaney médico puede calcular la fecha de nacimiento de delaney bebé  Es posible que usted de a derrick a delaney bebé en cualquier momento desde la semana 37 hasta 2 semanas después de la fecha calculada de Vane  Busque atención médica de inmediato si:   · Usted presenta un britt dolor de patti que no desaparece  · Usted tiene cambios en la visión nuevos o en aumento, russell visión borrosa o con manchas  · Usted tiene inflamación nueva o creciente en delaney tyra o og  · Usted tiene dolor o cólicos en el abdomen o la parte baja de la espalda  · Usted tiene sangrado vaginal   Comuníquese con delaney médico o obstreta si:   · Usted tiene calambres, presión o tensión abdominal     · Usted tiene un cambio en la secreción vaginal     · Usted no puede retener alimentos ni líquidos y está perdiendo [place]  · Usted tiene escalofríos o fiebre      · Usted tiene comezón, ardor o dolor vaginal      · Usted tiene Adrian Stain secreción vaginal amarillenta, verdosa, diana o de [employer]  · Usted tiene dolor o ardor al Luciano Life, orina menos de lo habitual o tiene [place] rosada o sanguinolenta  · Usted tiene preguntas o inquietudes acerca de delaney condición o cuidado  Cambios corporales que pueden ocurrir ariel delaney embarazo:   · Los cambios en los senos  que usted Otilio Havers sensibilidad y cosquilleo ariel la primera parte de delaney Carmie Macedo  Los senos se volverán más grandes  Es posible que necesite un sostén con soporte  Es posible que usted adria Fanny Tomas secreción delgada y PRASHANT, conocida russell calostro, que sale de randall pezones ariel el hanny trimestre  El calostro es un líquido que se convertirá en [place] alrededor de 3 días después de usted flori dado a derrick  · Cambios en la piel y estrías  podrían ocurrir ariel delaney embarazo  Es posible que usted tenga marcas cantrell, conocidas russell estrías, en delaney piel  Las [employer] se desvanecen después del Carmie Macedo  Utilice crema si delaney piel está seca y con comezón  La piel de delaney tyra, alrededor de los pezones y debajo de delaney ombligo podría oscurecerse  La mayoría del Maria Elena, delaney piel Claudette Jayda a delaney color normal después del nacimiento de delaney bebé  · El malestar matutino  consiste en náuseas y vómitos que pueden ocurrir en cualquier momento del día  Evite los alimentos grasosos y picantes  Coma comidas pequeñas ariel el día en vez de porciones grandes  El jengibre puede ayudar a [employer]  Consulte con delaney médico acerca de otras formas para disminuir las náuseas y el vómito  · Acidez estomacal  puede ser causada por los cambios hormonales ariel delaney Carmie Macedo  El [place] en crecimiento puede empujar delaney estómago hacia arriba y forzar ácido estomacal a acumularse dentro de delaney esófago  [place] o 5 comidas pequeñas cada día en vez de comidas grandes  Evite los alimentos picantes  Evite comer charlie antes de irse a la cama      · Estreñimiento  puede desarrollarse ariel delaney embarazo  Para tratar el estreñimiento, coma alimentos altos en fibra russell cereales con fibra, frijoles, frutas, verduras, panes integrales y [place]  Junnie Fuss de Daniella regular y tome suficiente agua  Es posible que delaney médico sugiera un suplemento con fibra para ablandar randall evacuaciones intestinales  Consulte con delaney médico antes de usar cualquier medicamento para disminuir el estreñimiento  · Las hemorroides  son Judeen Reason grandes en el área rectal  Pueden causar dolor, comezón y sangrado de color connell vivo en delaney recto  Para disminuir el riesgo de hemorroides, prevenga el estreñimiento y no se esfuerce cuando tenga nicholas evacuación intestinal  Si usted tiene hemorroides, sumérjase en nicholas bañera con agua tibia para aliviar la incomodidad  Consulte con delaney médico cómo puede tratar las hemorroides  · Los calambres y la hinchazón en las piernas  pueden ser causados por niveles bajos de calcio o por el peso adicional del [facility]  Eleve randall piernas por encima del nivel de delaney corazón para disminuir la hinchazón  Ariel un calambre en la pierna, estire o de un masaje al [employer] que tiene el calambre  El calor puede ayudar a disminuir el dolor y los espasmos musculares  Aplique calor sobre el músculo por 20 a 30 minutos cada 2 horas por la cantidad de días que se le indique  · Dolor en la espalda  puede ocurrir a medida que delaney bebé crece  No esté de pie por largos periodos de tiempo ni levante objetos pesados  Use nicholas buena postura mientras esté de pie, se agache o se doble  Use zapatos de tacón bajo con un buen soporte  Descansar puede también ayudarla a aliviar el dolor de espalda  Pregunte a delaney médico acerca de ejercicios que usted pueda hacer para fortalecer los músculos de delaney espalda  Manténgase saludable ariel delaney embarazo:   · Consuma alimentos saludables y variados    Alimentos saludables incluyen frutas, verduras, panes de ken integral, alimentos lácteos bajos en grasa, frijoles, Fabby Bronson y pescado  Louin líquidos russell se le haya indicado  Pregunte cuánto líquido debe lisette cada día y cuáles líquidos son los más adecuados para usted  Limite el consumo de cafeína a menos de [address]  Limite el consumo de pescado a 2 porciones cada semana  Escoja pescado con concentraciones bajas de maria dolores russell atún ligero enlatado, camarón, cangrejo, salmón, bacalao o tilapia  No  coma pescado con concentraciones altas de maria dolores russell pez kailee, caballa gigante, pargo rayado y tiburón  · [address] [address]  Delaney necesidad de ciertas vitaminas y [address], russell el ácido fólico, aumenta ariel el [facility]  Las vitaminas prenatales proporcionan algunas de las vitaminas y minerales adicionales que usted necesita  Las vitaminas prenatales también podrían ayudar a disminuir el riesgo de ciertos defectos de nacimiento  · Pregunte cuánto peso usted debe aumentar ariel delaney embarazo  Demasiado aumento de peso o muy poco puede ser poco saludable para usted y delaney bebé  · Consulte con delaney médico acerca de hacer ejercicio  El ejercicio moderado puede ayudarla a mantenerse en forma  Delaney médico la ayudará a planear un programa de ejercicios que sea seguro para usted ariel delaney [facility]  · No fume  Si usted fuma, nunca es demasiado tarde para dejar de hacerlo  Fumar aumenta el riesgo de aborto espontáneo y otros problemas de mnyor ariel edlaney [place]  Fumar puede causar que delaney bebé nazca antes de tiempo o que pese menos al nacer  Solicite información a delaney médico si usted necesita ayuda para dejar de fumar  · No consuma alcohol  El alcohol pasa de delaney cuerpo al bebé a través de la placenta  Puede afectar el desarrollo del cerebro de delaney bebé y provocar el síndrome de alcoholismo fetal (SAF)  FAS es un tarsha de condiciones que causan [address], de comportamiento y de crecimiento  · Consulte con delaney médico antes de lisette cualquier medicamento  Muchos medicamentos pueden perjudicar a gilbert bebé si usted los la nena [address]  No tome ningún medicamento, vitaminas, hierbas o suplementos sin leela consultar con gilbert Onel Sensor  use drogas ilegales o de la alexander (russell marihuana o cocaína) mientras está embarazada  Consejos de seguridad:   · Evite jacuzzis y saunas  No use un jacuzzi o un sauna mientras usted está embarazada, especialmente ariel el primer trimestre  Los [facility] y los saunas aumentan la temperatura de gilbert bebé y el riesgo de defectos de nacimiento  · Evite la toxoplasmosis  [place] es nicholas infección causada por comer carne cruda o estar cerca del excremento de un randell infectado  [place] puede causar malformaciones congénitas, aborto espontáneo y Sanket Schein  Lávese las og después de tocar carne cruda  Asegúrese de que la carne esté tessie cocida antes de comerla  Evite los huevos crudos y la Cleave Bone  Use guantes o pida que alguien la ayude a limpiar la caja de arena del randell mientras usted Huy Seed  · Consulte con gilbert médico acerca de viajar  El [address] cómodo para viajar es ariel el hanny trimestre  Pregunte a gilbert médico si usted puede viajar después de las 36 semanas  Es posible que no pueda viajar en avión después de las 36 [facility]  También le puede recomendar que evite largos viajes por carretera  Programe un control con gilbert médico u obstetra según lo indicado:  Vaya a todas randall citas prenatales ariel gilbert embarazo  Anote randall preguntas para que se acuerde de hacerlas ariel randall visitas  © 2017 26[address] Justin Gardiner Information is for End User's use only and may not be sold, redistributed or otherwise used for commercial purposes  All illustrations and images included in CareNotes® are the copyrighted property of A D A M , Inc  or Chris Edge  Esta información es sólo para uso en educación  Gilbert intención no es darle un consejo médico sobre enfermedades o tratamientos  Colsulte con delaney Rosalina Eugene farmacéutico antes de seguir cualquier régimen médico para saber si es seguro y efectivo para usted  [place] y vómito en el embarazo   CUIDADO AMBULATORIO:   La náusea y el vómito en el embarazo  pueden suceder a cualquier hora del día  Estos síntomas usualmente comienzan antes de la semana 9 del embarazo y terminan para la semana 14 (hanny trimestre)  Algunas mujeres pueden tener náusea o vómito por un tiempo prolongado  Estos síntomas pueden afectar a algunas mujeres ariel el embarazo  La náusea y el vómito no dañan a delaney bebé  Estos síntomas pueden dificultarle queta actividades diarias  Busque atención médica de inmediato si:   · Usted presenta signos de deshidratación  Por ejemplo, orina de color amarillo oscuro, boca y labios resecos, piel reseca, latido cardíaco acelerado y orinar menos de lo normal     · Usted tiene dolor abdominal intenso  · Usted se siente demasiado débil o mareado russell para ponerse de pie  · Usted nota nahomi en delaney vómito o en queta deposiciones  Pregúntele a delaney Panfilo Bun vitaminas y minerales son adecuados para usted  · Usted vomita más de 4 veces en 1 día  · Usted no ha podido retener líquidos en el estómago por más de 1 día  · Usted pierde más de 2 libras  · Usted tiene fiebre  · Queta náuseas y vómito continúan por más de 14 semanas  · Usted tiene preguntas o inquietudes acerca de delaney condición o cuidado  El tratamiento  para la náusea y el vómito en el embarazo generalmente no es necesario  Usted puede [employer] alimentos que come y en queta actividades para ayudar a controlar queta síntomas  Es posible que usted necesite probar varias cosas para determinar qué funciona mejor para usted  Hable con delaney médico si queta síntomas no mejoran con los cambios que se recomiendan a continuación  Es posible que usted necesite vitamina B6 y medicamento si estos cambios no ayudan o si queta síntomas se vuelven graves     Cambios de nutrición que usted puede realizar para controlar la náusea y el vómito:   · Coma porciones pequeñas ariel el día en vez de 3 comidas con porciones grandes  Es más probable que usted tenga náusea y vómito cuando delaney estómago está vacío  Consuma alimentos bajos en grasa y ricos en proteínas  Ejemplos son [place], frijoles, pavo y bartolo sin elvia [place], russell galletas saladas, cereal seco o un sandwich chico antes de [facility] [employer]  · Coma galletas saladas o pan herson antes de levantarse de delaney cama por la mañana  Levántese de la cama lentamente  Los movimientos repentinos podrían provocarle mareos y [place]  · Consuma alimentos blandos cuando se sienta con náuseas  Ejemplos de alimentos blandos son el pan herson, cereal seco, pasta sin Magdaline Heimlich y mendoza  Otros alimentos blandos incluyen a las [employer], plátanos, gelatina y pretzels  Evite los alimentos condimentados, grasosos y fritos  Evite otros alimentos que le provoquen náuseas  · [place] líquidos que contengan gengibre  [place] refresco de gengibre hecho con gengibre real o té de gengibre hecho con gengibre fresco rallado  Las [employer] o dulces de gengibre también podrían ayudar a aliviar la náusea y el vómito  · [place] líquidos entre alimentos en vez de tomarlos con los alimentos  [place] al menos 30 minutos después de comer para lisette líquidos  [place] cantidades pequeñas de líquidos con frecuencia ariel el día para evitar la deshidratación  Consulte cuál es la cantidad de líquido que usted debería consumir al día  Otros cambios que usted puede realizar para controlar la náusea y el vómito:   · Evite los olores que la [place]  Los olores dwayne podrían provocar que [employer] náuseas y el vómito, o podrían empeorarlo   Camine un poco, prenda un ventilador o trate de dormir con la ventana abierta para respirar aire fresco  Cuando esté cocinando, jeremi las ventanas para eliminar el olor que podría provocarle náuseas  · No se cepille randall dientes inmediatamente después de comer  si eso le provoca náuseas  · Descanse cuando lo necesite  Comience nicholas actividad lentamente y vuelva a gilbert rutina normal conforme se empiece a sentir mejor  · Hable con gilbert médico acerca de las vitaminas prenatales  Las vitaminas prenatales pueden provocar náuseas a algunas mujeres  Trate de tomárselas por la noche o con un bocadillo  Si jasmyne cambio no le [facility], gilbert médico podría recomendarle un tipo de vitamina diferente  · No use ningún medicamento, vitamina o suplemento para controlar randall síntomas sin antes consultarlo con gilbert médico   Varios medicamentos pueden dañar a gilbert bebé que no ha nacido  · El ejercicio de ligero a moderado  podría ayudar a aliviar randall síntomas  También podría ayudarla a dormir mejor por la noche  Pregunte a gilbert médico acerca del mejor plan de ejercicio para usted  Acuda a randall consultas de control con gilbert médico según le indicaron  Anote randall preguntas para que se acuerde de hacerlas ariel randall visitas  © 2017 [address] Justin  Information is for End User's use only and may not be sold, redistributed or otherwise used for commercial purposes  All illustrations and images included in CareNotes® are the copyrighted property of A D A M , Inc  or Chris Edge  Esta información es sólo para uso en educación  Gilbert intención no es darle un consejo médico sobre enfermedades o tratamientos  Colsulte con gilbert Jyoti Rivas farmacéutico antes de seguir cualquier régimen médico para saber si es seguro y efectivo para usted  Virus del Zika: Información para mujeres embarazadas   CUIDADO AMBULATORIO:   El virus del [address] [facility] mosquitos son los portadores del virus del [place]  El virus es transmitido a un humano a través de la picadura de un mosquito infectado   El virus también se podría transmitir de Mavis Angles persona a otra por medio de la relación sexual  El virus del [facility] se podría transmitir de nicholas madre al feto  [place] podría provocar defectos de nacimiento, russell nicholas falta de desarrollo cerebral  También puede causar la pérdida del embarazo  En la actualidad no hay nicholas vacuna para evitar el virus del [facility]  Los signos y síntomas más comunes incluyen los siguientes:  Es posible que no presente signos ni síntomas del virus del [facility]  Si usted desarrolla signos o síntomas, podrían presentarse de repente y durar por 2 a 7 angelique  Puede presentar cualquiera de los siguientes signos o síntomas:  · Fiebre o escalofríos    · Urticaria    · Dolor de patti    · Dolor muscular o articular    · Enrojecimiento o comezón en los ojos  Pregúntele a delaney Terrilyn [place] vitaminas y minerales son adecuados para usted  · Usted piensa que ha estado expuesto al virus del [facility]  · Tiene síntomas del virus del [facility]  · Usted tiene preguntas o inquietudes acerca de delaney condición o cuidado  Evite las picaduras de mosquitos:  No debe viajar a un sitio donde le virus del Zika es común  Pregunte a delaney médico qué lugar es seguro para viajar  Evite las picaduras de mosquito para disminuir delaney riesgo de contraer la infección por el virus del Zika:  · Aplique repelente de insectos  Pregunte a delaney médico cuál repelente de insectos es adecuado para usted  La mayoría de los repelentes de insectos se pueden usar sin riesgos ariel el embarazo  Siga las indicaciones en el empaque del repelente de insectos  La siguiente es nicholas lista de consejos para usar el repelente de insectos:     ¨ No aplique repelente de insectos en la piel debajo de la ropa  ¨ Aplique protector solar antes de aplicar repelente de insectos  ¨ Use un repelente de insectos cada vez que tenga planeado estar al [employer]  Use un repelente todo el tiempo si usted viaja o vive en lugares de alto riesgo  Vuelva a aplicarse el repelente según las indicaciones       ¨ Aplíquese repelente de insectos todos los días por 3 semanas después de viajar a áreas de [address] riesgo  · Use nicholas camisa de manga larga y pantalones  [place] le protegerá la piel de las picaduras de mosquito  · Use mallas o mosquiteros  Use un mosquitero alrededor de delaney cama  Cuando viaje, escoja un lugar que tenga mosquiteros en todas las ventanas y ce  [address] y ce en delaney casa  Repare las redes o mosquiteros que tengan agujeros, o compre mosquiteros o redes nuevos  · [address] y ventanas cerradas  En lo posible, use el aire acondicionado para mantener fresca la casa  · Aplique un repelente de insectos en la ropa y el equipo  [place] incluye las botas, pantalones, medias y carpas de campaña  Gini esto cuando acampe, escale o trabaje en el exterior  Usted también puede comprar prendas de vestir e implementos que ya vienen con el repelente de insectos  · Limpie y vacíe todos los recipientes con agua nicholas vez a la semana  [address] recipientes para los [address], los baldes de agua, los gilmore de desagüe de la casa, los floreros y la marisel para pájaros  Los mosquitos ponen los huevos cerca del agua  Es mejor que vacíe y restriegue estos recipientes con agua y [address] Paulie  Mantenga los recipientes de agua cubiertos o tessie cerrados en cuanto sea posible  · Use un repelente de insectos en el interior y exterior de delaney casa  Use un atomizador repelente de insectos que es seguro para usar en el interior de delaney casa  Coloque un aparato atomizador contra los mosquitos en el exterior de delaney casa  Instale el aparato en un lugar oscuro y fresco  Consulte a delaney médico dónde puede conseguir estos artículos  Siga las instrucciones que vienen con estos productos  Practique sexo seguro ariel el embarazo:  Lo siguiente disminuirá el riesgo de contraer el virus del [place]  También disminuirá el riesgo de que le pase el virus del Zika a delaney bebé    · No tenga contacto sexual con un hombre o nicholas wesly con infección por el virus del [address] embarazada  No tenga contacto sexual con un hombre o nicholas wesly que anguiano estado expuestos al virus del Zika mientras usted [place]  Gilbert socorro puede estar en riesgo por exposición si ha viajado a nicholas samantha donde existe infección de [facility]  Por sexo nos referimos al sexo oral, vaginal y anal      · Si usted decide tener relaciones sexuales ariel el [facility], utilice un condón o un método de roberts de látex cada vez que tenga relaciones sexuales  Use protección para todo tipo de contacto sexual con un hombre o nicholas wesly  [place] incluye el sexo oral, vaginal y anal  Use un condón nuevo o nicholas roberts de látex cada vez que tenga relaciones sexuales  Asegúrese que el condón se ajuste y esté tessie colocado  Si usted es alérgico al látex, use un producto sin base de látex russell poliuretano  Para obtener la información más actualizada sobre el virus del Zika:  Lo que se conoce del virus del Zika está cambiando rápidamente  Obtenga la información más actualizada en:  · Centers for Disease Control and Prevention Information on [address]  [address] [facility]  Phone: [phone]  Web Address: [facility]  Acuda a randall consultas de control con gilbert médico según le indicaron  Anote randall preguntas para que se acuerde de hacerlas ariel randall visitas  © 2017 [address] Justin Gardiner Information is for End User's use only and may not be sold, redistributed or otherwise used for commercial purposes  All illustrations and images included in CareNotes® are the copyrighted property of A D A M , Inc  or Chris Edge  Esta información es sólo para uso en educación  Gilbert intención no es darle un consejo médico sobre enfermedades o tratamientos  Colsulte con gilbert Teo Herr farmacéutico antes de seguir cualquier régimen médico para saber si es seguro y efectivo para usted  Dieta para el embarazo   LO QUE NECESITA SABER:   ¿Qué es nicholas dieta saludable ariel el [facility]? Ray Le saludable ariel el embarazo es un plan alimenticio que proporciona la cantidad de calorías y nutrientes que usted necesita ariel el embarazo  El cuerpo necesita calorías y nutrientes adicionales para apoyar el desarrollo del bebé  Usted necesita aumentar la cantidad correcta de peso para tener un bebé y un embarazo saludables  Los bebés que nacen con un peso saludable tienen un riesgo susi de ciertos problemas cardíacos al nacer y ariel delaney alexa  Ray Le saludable podría ayudarlo a evitar que aumente mucho de [place]  El aumento excesivo de peso le podría provocar problemas ariel el [facility] y [place]  ¿Qué debería evitar ariel el [facility]? · Alcohol:  No daniel alcohol ariel el embarazo  El alcohol puede aumentar el riesgo de sufrir un aborto espontáneo (perder el bebé)  El bebé también podría nacer muy pequeño y Zachary Gwendolyn otros problemas de [place], russell problemas de aprendizaje ariel delaney alexa  · Cafeína:  No se conoce cuales son Annmarie Senior  Limite el consumo de cafeína para evitar posibles problemas de mynor  La cafeína puede encontrase en el café, té, gaseosas, bebidas deportivas y chocolate  · Alimentos que contienen maria dolores:  El maria dolores se encuentra de Daniella natural en chapo todos los tipos de pescados y mariscos  Algunos tipos de pescados absorben niveles más altos de maria dolores que pueden ser nocivos para un bebé antes de nacer  Consuma solamente pescados y mariscos bajos en maria dolores  Coma un sherrill de 12 onzas a la semana de pescado o [employer] tengan un nivel bajo de maria dolores  Entre éstos se [place], el atún [employer] agua, el xochitl, el fredi y Daniel Jordan In Pedriolo  Coma sólo 6 onzas de atún grey por semana  El atún albacora tiene más maria dolores que el atún [place]  No  coma tiburón, pez kailee, caballa ni blanquillo       · Alimentos crudos o poco cocidos:  Usted no debería comer dariene, trevor, huisraos, pescado o mariscos (camarón, son ying) crudos o poco cocidos  Cocine los alimentos sobrantes y listos para comer russell los hot dogs hasta que estén tessie calientes  · Alimentos sin pasteurizar:  Los alimentos sin pasteurizar son aquéllos que no pasaron por el proceso de calentamiento (pasteurización) que destruye la bacteria  Usted no debe beber [place], Phylicia 88 que no haya sido pasteurizado  Estos [place] [employer], feta, Camembert, blue y [place]  ¿Qué alimentos se pueden comer ariel el embarazo? Consuma nicholas variedad de alimentos de cada ana maría de los grupos que se enumeran abajo  El [place]'s dirá cuántas porciones de cada tarsha usted debería comer diariamente para obtener las calorías suficientes  La cantidad de calorías que usted necesita depende de la actividad diaria, el peso antes del [facility] y [place] actual  Los médicos dividen el embarazo en 3 periodos de tiempo que se conocen russell trimestres  En el primer trimestre, usualmente usted no necesita calorías adicionales  En el hanny y tercer trimestre, la mayoría de las mujeres deberían consumir alrededor de 300 calorías adicionales cada día  · Frutas y verduras:  La mitad del plato debería contener frutas y verduras  ¨ Frutas:  Seleccione frutas frescas, enlatadas o secas tan seguido russell sea posible  ¨ 1 taza de [place], picada, cocida o enlatada (enlatada en almíbar o 100% de [facility])    ¨ Un durazno, naranja o plátano temitope    ¨ ½ taza de fruta seca    ¨ 1 taza de jugo de fruta    ¨ Verduras:  Consuma mas verduras de color jessika oscuro, connell o anaranjado  Los vegetales jessika oscuro incluyen la brócoli, [facility] y repollo jessika  Ejemplos de verduras de color anaranjado y connell son las zanahorias, el camote, la calabaza de invierno, naranjas y chile connell      ¨ 1 taza de vegetales cocidos o crudos    ¨ 1 taza de jugo de verduras    ¨ 2 tazas de hojas verdes crudas    · Granos:  La mitad de los granos que consume cada día deberían ser granos enteros  ¨ Granos integrales      ¨ ½ taza de arroz integral o chris cocidos    ¨ 1 taza (1 onza) de cereal seco de grano entero    ¨ 1 rebanada de pan de 100% de grano entero o de benjamin    ¨ 3 tazas de palomitas de maíz    ¨ Otros granos:      ¨ ½ taza de arroz grey o pasta cocidos    ¨ ½ de un pan inglés    ¨ 1 tortilla pequeña de harina o de maíz    ¨ 1 rosquilla pequeña    · Productos lácteos:  Elija productos lácteos sin grasa o bajos en grasa:    ¨ 1½ onzas de queso firme (duke Galdamez, karma)     ¨ 1 taza (8 onzas) de leche o yogurt sin o bajo en grasa    ¨ 1 taza de yogurt o pudín congelado bajo en grasa    · Carne y otras bustillo de proteínas:  Elija laith magras y de ave  [place]ornee, ase y cocine la carne a la kyleigh en vez de freírla  Incluya nicholas variedad de mariscos en lugar de algunas laith y aves cada semana  Consuma nicholas variedad de alimentos con proteínas:    ¨ ½ onza de chante secos (12 almendras, 24 pistachos, 7 mitades de nueces) o 1 cucharada de crema de cacahuate (1 onza)    ¨ ¼ de taza de soya tofu o tempeh (1 onza)    ¨ 1 huevo    ¨ ¼ de taza de frijoles, chícharos o lentejas cocidos (1 onza)    ¨ 1 pechuga de bartolo pequeña o 1 trucha pequeña (alrededor de 3 onzas)    ¨ 1 trozo de salmón (4 a 6 onzas)    ¨ 1 hamburguesa pequeña de carne magra (2 a 3 onzas)    · Grasas:  Limite las grasas saturadas, trans y el colesterol  Estas grasas no son saludables y se encuentran en la [facility], la [place], la margarina de taiwo y en la grasa animal  Elija grasas saludables russell la poliinsaturada y la monoinsaturada:     ¨ 1 cucharada de aceite de [place], [place], [place], [place] o de soya    ¨ 1 cucharada de margarina erin    ¨ 1 cucharadita de [facility]    ¨ 2 cucharadas de aderezo para ensaladas    ¨ ½ de un aguacate  ¿Qué suplementos vitamínicos y minerales podría necesitar? El médico le indicará si usted necesita un suplemento y qué tipo debería lisette   Hable con delaney médico antes de lisette cualquier otra clase de suplemento, incluyendo los herbales (naturales)  · Vitaminas prenatales:  Consuma nicholas variedad de alimentos saludables, aun si usted está tomando vitaminas prenatales  Si olvida lisette la vitamina, no se tome el doble al siguiente día  · Ácido fólico:  Usted necesita al menos 693 mcg de ácido fólico por día antes de embarazarse  El ácido fólico ayuda a formar el cerebro y la médula beck del bebé en el comienzo del [facility]  Ariel el [facility], gilbert necesidad diaria de ácido fólico aumenta a alrededor de 600 mcg  Obtenga diariamente ácido fólico, consumiendo frutas y jugos cítricos, verduras de hojas verdes, hígado o frijoles secos  El ácido fólico también se agrega a ciertos [employer] cereales para el desayuno, los productos de mendoza, las harinas y las pastas  · Zack:  El zack es un mineral que el cuerpo necesita para producir hemoglobina, que es nicholas parte de los glóbulos rojos  La hemoglobina ayuda a que la nahomi transporte oxígeno de los pulmones al dipesh del cuerpo  Los alimentos que son [place] buena marisel de zack son la carne, la carne de ave, pescado, frijoles, espinaca y cereales y panes fortificados  Gilbert cuerpo absorberá el zack de bustillo de carne, si tiene nicholas marisel de vitamina C al [employer]  Eliana té y café lejos de alimentos fortificados con zack y suplementos de zack  Usted necesita alrededor de 30 miligramos de zack cada día ariel el embarazo  · Calcio y vitamina D:  Las mujeres que no consumen productos lácteos pueden necesitar un suplemento de calcio y vitamina D  Hable con gilbert médico sobre suplementos de calcio si no come regularmente buenas bustillo de calcio  La cantidad de calcio que usted necesita es de 1,300 mg si tiene entre 14 y 25 años de edad y de 1,000 mg si tiene entre 23 y 48 años de edad  ¿Qué cambios en la dieta podrían ayudar si tengo malestar matutino?   El malestar matutino es común [employer] [place] [employer] primeros meses de embarazo  Usted podría sentirse con náuseas y vomitar varias veces al día  Para mejorar los síntomas del malestar matutino, coma poco y varias veces en vez de 3 comidas grandes  Los [employer] en carbohidratos russell las galletas saladas, el pan herson y la pasta podrían ser mas fáciles de comer  Eliana líquidos [employer] comidas en vez de hacerlo con las comidas  ¿Qué cambios en la dieta podrían disminuir el estreñimiento? [place] dieta neli en fibra puede mejorar los síntomas de estreñimiento  Los cereales integrales para desayunar, los panes integrales y los jugos de [place] pasa son altos en Cec[place]  Kenneth Gastelum y verduras crudas, así russell los frijoles cocidos también son [facility] buena marisel de [place]  También podría ayudar el aumentar el consumo de líquidos y realizar actividad física regularmente  Consulte con delaney médico antes de empezar un régimen de ejercicios  ¿Qué cambios en la dieta podrían disminuir la [place]? Para mejorar los síntomas de la [place], no se acueste después de comer  Cuando se acueste, duerma con la patti un poco elevada  Coma poco y con frecuencia, en vez de 3 comidas grandes  También podría ser de [employer] evitar la cafeína, el chocolate y las comidas condimentadas  ¿Cómo puedo obtener suficiente calcio si no puedo tolerar los productos lácteos? Si usted no puede beber [place] o consumir productos lácteos, trate la [place] sin lactosa o baja en lactosa, o la leche de soya fortificada con calcio  Pregunte a delaney médico acerca de píldoras que pueda lisette para ayudar a digerir los productos lácteos  Consuma otras bebidas y comidas que estén fortificadas con calcio, russell el [place]  ¿Qué otras pautas saludables jessenia seguir? · Jaret Tirado y nura:  Si usted es vegetariana o vegana, consuma suficiente proteína, vitamina B12 y zack ariel el embarazo   Algunas bustillo de estos nutrientes que no son carne, son los cereales fortificados, la [place] de [place], productos de soya (tofu y [place] de soya), nueces, granos y legumbres  Estos nutrientes también se [employer] y los productos lácteos  · Antojos:  Usted podría tener antojos de ciertos alimentos ariel el [facility]  Los alimentos que son altos en calorías, grasa y azúcar, no deben reemplazar a los alimentos que son saludables  Algunas mujeres tienen antojos por sustancias inusuales russell el [place] almidón para ropa, hielo y [place]  Esta condición se conoce russell pica  [place] podría conllevar a problemas de mynor russell anemia y provocar otros [address] [place] [address] [address]kwy  ¿Cuándo jessenia comunicarme con mi médico?   · Usted pierde peso sin proponérselo  · Tiene antojos por sustancias russell el [place] almidón para ropa o hielo  · Usted tiene preguntas o inquietudes acerca de smith condición o cuidado  ACUERDOS SOBRE SMITH CUIDADO:   Usted tiene el derecho de ayudar a planear smith cuidado  Discuta queta opciones de tratamiento con queta médicos para decidir el cuidado que usted desea recibir  Usted siempre tiene el derecho de rechazar el tratamiento  Esta información es sólo para uso en educación  Smith intención no es darle un consejo médico sobre enfermedades o tratamientos  Colsulte con smith Mehreen Shouts farmacéutico antes de seguir cualquier régimen médico para saber si es seguro y efectivo para usted  © 2017 2600 Justin Gardiner Information is for End User's use only and may not be sold, redistributed or otherwise used for commercial purposes  All illustrations and images included in CareNotes® are the copyrighted property of A D A M , Inc  or Chris Edge  Embarazo de la semana 7 a la 10   CUIDADO AMBULATORIO:   Qué cambios están ocurriendo en smith cuerpo:  La hormonas del embarazo podrían provocar que smith cuerpo pase por varios cambios ariel esta etapa del [place]  Es posible que usted se sienta más cansado de lo normal y que tenga cambios de humor, náuseas y vómitos, y servando de [place]   Queta senos podrían sentirse sensibles e inflamados y usted podría orinar con más frecuencia  Busque atención médica de inmediato si:   · Usted tiene dolor o cólicos en el abdomen o la parte baja de la espalda  · Usted tiene sangrado vaginal abundante o coágulos  · Le sale un material que parece tejido o coágulos grandes  Recolecte el material y tráigalo con usted  Pregúntele a gilbert Floresita Broom vitaminas y minerales son adecuados para usted  · Usted tiene un sangrado leve  · Usted tiene escalofríos o fiebre  · Usted tiene comezón, ardor o dolor vaginal      · Usted tiene nicholas secreción vaginal amarillenta, verdosa, diana o de [employer]  · Usted tiene dolor o ardor al [place], orina menos de lo habitual o tiene [place] rosada o sanguinolenta  · Usted tiene preguntas o inquietudes acerca de gilbert condición o cuidado  Cómo cuidarse en esta etapa de gilbert embarazo:   · Controle la náusea y el vómito  Evite los alimentos grasosos y picantes  Coma comidas pequeñas ariel el día en vez de porciones grandes  El jengibre puede ayudar a [employer]  Consulte con gilbert médico acerca de otras formas para disminuir las náuseas y el vómito  · Consuma alimentos saludables y variados  Alimentos saludables incluyen frutas, verduras, panes de ken integral, alimentos lácteos bajos en grasa, frijoles, laith magras y pescado  [place] líquidos russell se le haya indicado  Pregunte cuánto líquido debe lisette cada día y cuáles líquidos son los más adecuados para usted  Limite el consumo de cafeína a menos de [place] [address]  Limite el consumo de pescado a 2 porciones cada semana  Escoja pescado con concentraciones bajas de maria dolores russell atún al natural enlatado, camarón, salmón, bacalao o tilapia  No  coma pescado con concentraciones altas de maria dolores russell pez kailee, neli steele, freda garrison y sommer  · [address] [place]    Gilbert necesidad de ciertas vitaminas y [place] el ácido fólico, aumenta ariel el [facility]  Las vitaminas prenatales proporcionan algunas de las vitaminas y minerales adicionales que usted necesita  Las vitaminas prenatales también podrían ayudar a disminuir el riesgo de ciertos defectos de nacimiento  · Pregunte cuánto peso usted debería aumentar cada mes  Demasiado aumento de peso o muy poco puede ser poco saludable para usted y delaney bebé  · No fume  Si usted fuma, nunca es demasiado tarde para dejar de hacerlo  Fumar aumenta el riesgo de aborto espontáneo y otros problemas de mynor ariel delaney [facility]  Fumar puede causar que delaney bebé nazca antes de tiempo o que pese menos al nacer  Solicite información a delaney médico si usted necesita ayuda para dejar de fumar  · No consuma alcohol  El alcohol pasa de delaney cuerpo al bebé a través de la placenta  Puede afectar el desarrollo del cerebro de delaney bebé y provocar el síndrome de alcoholismo fetal (SAF)  FAS es un tarsha de condiciones que causan [address], de comportamiento y de crecimiento  · Consulte con delaney médico antes de lisette cualquier medicamento  Muchos medicamentos pueden perjudicar a delaney bebé si usted los la nena [address] [place] [address]  No tome ningún medicamento, vitaminas, hierbas o suplementos sin leela consultar con delaney Fior Sella  use drogas ilegales o de la alexander (russell marihuana o cocaína) mientras está embarazada  Consejos de seguridad ariel el embarazo:   · Evite jacuzzis y saunas  No use un jacuzzi o un sauna mientras usted está embarazada, especialmente ariel el primer trimestre  Los [facility] y los saunas aumentan la temperatura de delaney bebé y el riesgo de defectos de nacimiento  · Evite la toxoplasmosis  [place] es nicholas infección causada por comer carne cruda o estar cerca del excremento de un randell infectado  [place] puede causar malformaciones congénitas, aborto espontáneo y Sanket Schein  Lávese las og después de tocar carne cruda   Asegúrese de que la carne esté tessie cocida antes de comerla  Evite los huevos crudos y la Arleene Riddles  Use guantes o pida que alguien la ayude a limpiar la caja de arena del randell mientras usted Reynold Nice  Cambios que están ocurriendo con delaney bebé:  Para las 10 semanas, delaney bebé medirá alrededor de 2 ½ pulgadas desde la adrianna hasta la rabadilla (parte inferior o cóccix del bebé)  Delaney bebé pesa alrededor de ½ onza  Los [employer] del cuerpo, urssell el cerebro, corazón y pulmones se están formando  Las características faciales de delaney bebé también están comenzando a formarse  Lo que necesita saber acerca del cuidado prenatal:  El cuidado prenatal se trata de nicholas serie de visitas con delaney médico a lo kelly del embarazo  Ariel las primeras 28 semanas de delaney doris Gomez tendrá citas mensuales con delaney médico  El cuidado prenatal puede ayudar a evitar problemas ariel el [facility] y [place]  Delaney médico le hará preguntas acerca de delaney mynor y de cualquier embarazo previo que usted haya tenido  Él también le preguntará acerca de cualquier medicamento que esté tomando  Es posible que también necesite alguno de los siguientes tratamientos:  · Nicholas prueba de [place]  se realiza para revisar si delaney awais uterino tiene células anormales  El awais uterino es la apertura angosta que está en la parte inferior de [place]  El awais uterino se junta con la parte superior de la vagina  · Un examen pélvico  le permite a delaney médico observar delaney awais uterino (la parte inferior de delaney [place])  Delaney médico utiliza un espéculo para abrir delaney vagina suavemente  Él examinará el tamaño y forma de delaney útero  · Los análisis de nahomi:  podrían realizarse para revisar signos de anemia o el tipo de [place]  Delaney médico también podría ordenarle otros exámenes de nahomi para revisar si usted es inmune a ciertas enfermedades russell la Hepatitis B  También podría recomendarle un examen del VIH      · Análisis de orina  también podrían realizarse para revisar si hay signos de infección  · Gilbert presión arterial y peso  será revisado  © 2017 2600 Justin Gardiner Information is for End User's use only and may not be sold, redistributed or otherwise used for commercial purposes  All illustrations and images included in CareNotes® are the copyrighted property of A D A M , Inc  or Chris Edge  Esta información es sólo para uso en educación  Gilbert intención no es darle un consejo médico sobre enfermedades o tratamientos  Colsulte con gilbert [place] farmacéutico antes de seguir cualquier régimen médico para saber si es seguro y efectivo para usted  El embarazo de la semana 11 a la 14   CUIDADO AMBULATORIO:   Qué cambios están ocurriendo en gilbert cuerpo: Ahora usted está al término del primer trimestre y entrando al hanny trimestre  Los [employer] matutinos por lo general desaparecen para [employer]  Es posible que usted tenga otros síntomas russell fatiga, orinar con frecuencia y servando de [place]  Usted podría flori [employer] 2 a 4 libras hasta ahora  Busque atención médica de inmediato si:   · Usted tiene dolor o cólicos en el abdomen o la parte baja de la espalda  · Usted tiene sangrado vaginal abundante o coágulos  · Le sale un material que parece tejido o coágulos grandes  Recolecte el material y tráigalo con usted  Pregúntele a gilbert Noah Acevedo vitaminas y minerales son adecuados para usted  · Usted no puede retener alimentos ni líquidos y está perdiendo [place]  · Usted tiene un sangrado leve  · Usted tiene escalofríos o fiebre  · Usted tiene comezón, ardor o dolor vaginal      · Usted tiene nicholas secreción vaginal amarillenta, verdosa, diana o de [employer]  · Usted tiene dolor o ardor al Josette Fire, orina menos de lo habitual o tiene [place] rosada o sanguinolenta  · Usted tiene preguntas o inquietudes acerca de gilbert condición o cuidado  Cómo cuidarse en esta etapa de gilbert embarazo:   · Descanse lo suficiente    Es posible que usted se sienta más cansada de lo normal  Usted podría necesitar lisette siestas o acostarse más temprano  · Controle la náusea y el vómito  Evite los alimentos grasosos y picantes  Coma comidas pequeñas ariel el día en vez de porciones grandes  El jengibre puede ayudar a [employer]  Consulte con delaney médico acerca de otras formas para disminuir las náuseas y el vómito  · Consuma alimentos saludables y variados  Alimentos saludables incluyen frutas, verduras, panes de ken integral, alimentos lácteos bajos en grasa, frijoles, laith magras y pescado  [place] líquidos russell se le haya indicado  Pregunte cuánto líquido debe lisette cada día y cuáles líquidos son los más adecuados para usted  Limite el consumo de cafeína a menos de [address]  Limite el consumo de pescado a 2 porciones cada semana  Escoja pescado con concentraciones bajas de maria dolores russell atún al natural enlatado, camarón, salmón, bacalao o tilapia  No  coma pescado con concentraciones altas de maria dolores russell pez kailee, caballa gigante, pargo rayado y tiburón  · [address] [place]  Delaney necesidad de ciertas vitaminas y [address] [facility], russell el ácido fólico, aumenta ariel el [facility]  Las vitaminas prenatales proporcionan algunas de las vitaminas y minerales adicionales que usted necesita  Las vitaminas prenatales también podrían ayudar a disminuir el riesgo de ciertos defectos de nacimiento  · No fume  Si usted fuma, nunca es demasiado tarde para dejar de hacerlo  Fumar aumenta el riesgo de aborto espontáneo y otros problemas de mynor ariel delaney [facility]  Fumar puede causar que delaney bebé nazca antes de tiempo o que pese menos al nacer  Solicite información a delaney médico si usted necesita ayuda para dejar de fumar  · No consuma alcohol  El alcohol pasa de delanye cuerpo al bebé a través de la placenta   Puede afectar el desarrollo del cerebro de delaney bebé y provocar el síndrome de alcoholismo fetal (SAF)  FAS es un tarsha de condiciones que causan [address], de comportamiento y de crecimiento  · Consulte con delaney médico antes de lisette cualquier medicamento  Muchos medicamentos pueden perjudicar a delaney bebé si usted los la nena [address]  No tome ningún medicamento, vitaminas, hierbas o suplementos sin leela consultar con delaney Veronica Churn  use drogas ilegales o de la alexander (russell marihuana o cocaína) mientras está embarazada  Consejos de seguridad ariel el embarazo:   · Evite jacuzzis y saunas  No use un jacuzzi o un sauna mientras usted está embarazada, especialmente ariel el primer trimestre  Los [facility] y los saunas aumentan la temperatura de delaney bebé y el riesgo de defectos de nacimiento  · Evite la toxoplasmosis  [place] es nicholas infección causada por comer carne cruda o estar cerca del excremento de un randell infectado  [place] puede causar malformaciones congénitas, aborto espontáneo y Sanket Schein  Lávese las og después de tocar carne cruda  Asegúrese de que la carne esté tessie cocida antes de comerla  Evite los huevos crudos y la Ezzie Dynes  Use guantes o pida que alguien la ayude a limpiar la caja de arena del randell mientras usted Marcus Grime  Cambios que están ocurriendo con delaney bebé: Delaney bebé tiene completamente formadas las uñas de randall og y pies  Ahora delaney latido se puede escuchar  Pregunte a delaney médico si usted puede escuchar el latido cardíaco de delaney bebé  Para la semana 14, delaney bebé mide más de 4 pulgadas desde la punta de la patti hasta la rabadilla (parte inferior del bebé)  Delaney bebé pesa más de 3 onzas  Lo que necesita saber acerca del cuidado prenatal:  Ariel las primeras 29 semanas de delaney embarazo, usted tendrá citas mensuales con delaney médico  El cuidado prenatal puede ayudar a evitar problemas ariel el [place] y [place]  Delaney médico le revisará delaney presión arterial y [place]   Es posible que también necesite alguno de los siguientes tratamientos:  · Un examen de orina  también podría realizarse para revisarle el azúcar y la proteína  Estas son señales de diabetes gestacional o de infección  · Se le pueden ofrecer  pruebas de detección de trastornos genéticos  Estos exámenes de detección revisan el riesgo de gilbert bebé de trastornos genéticos russell el síndrome de [place]  Los exámenes de detección incluyen un examen de nahomi y un ultrasonido  · El ritmo cardíaco de gilbert bebé  será revisado  © 2017 2600 Justin Gardiner Information is for End User's use only and may not be sold, redistributed or otherwise used for commercial purposes  All illustrations and images included in CareNotes® are the copyrighted property of A D A M , Inc  or Chris Edge  Esta información es sólo para uso en educación  Gilbert intención no es darle un consejo médico sobre enfermedades o tratamientos  Colsulte con gilbert Charna Heckler farmacéutico antes de seguir cualquier régimen médico para saber si es seguro y efectivo para usted  Vacuna de refuerzo contra la Difteria/tos ferina/tétanos (Por inyección)   Protege contra las infecciones causadas por el tétanos (trismo), la difteria o la pertusis (tos [place])  Esta es nicholas vacuna de refuerzo  Vianey(s) : Adacel, Boostrix   Existen muchas otras marcas de Waldemar  Jasmyne medicamento no debe ser usado cuando:   Usted no debe recibir esta vacuna si alguna vez ha tenido nicholas reacción alérgica a la vacuna contra el tétanos, la difteria o tos ferina por separado o en combinación  Usted no debe recibir esta vacuna si ha tenido convulsiones, cambios del Alex mental o cualquier otra reacción grave dentro de los 7 días de flori recibido nicholas vacuna contra la tos [place]  Forma de usar jasmyne medicamento:   Inyectable  · Majo Golden enfermera u otro médico le administrará jasmyne medicamento  · Gilbert médico le recetará gilbert dosis exacta y le indicará la frecuencia con la que debe administrarse   Jasmyne medicamento se administra mediante nicholas inyección en ana maría de randall músculos  · Usted podría recibir otras vacunas al mismo tiempo que reciba Clementina, iban en nicholas samantha distinta del cuerpo  Usted debe recibir las instrucciones para el paciente para todas las vacunas  Coméntele a delaney médico o enfermera cualquier pregunta que tenga al respecto  · Maryana y siga las instrucciones para el paciente que vienen con el medicamento  Hable con delaney médico o farmacéutico si tiene alguna pregunta  Medicamentos y [employer] que debe evitar:   Consulte con delaney médico o farmacéutico antes de usar cualquier medicamento, incluyendo los que compra sin receta médica, las vitaminas y los productos herbales  · Asegúrese de informarle a delaney médico si usted está recibiendo un tratamiento o medicamento que debilita delaney sistema inmunológico  Ésto incluye la radioterapia, medicamentos esteroides (russell dexametasona, hidrocortisona, metilprednisolona, prednisolona, prednisona, Medrol®), o medicamentos contra el cáncer  Precauciones ariel el uso de jasmyne medicamento:   · Asegúrese que delaney médico sepa si usted está embarazada o lactando o sufre de epilepsia, un sistema inmunológico débil o un antecedente de un derrame cerebral  Informe a delaney médico si usted está enfermo o tiene fiebre  · Avísele a delaney médico acerca de cualquier reacción que usted tenga después de flori recibido nicholas vacuna  Ésto incluye desmayos, convulsiones, nicholas fiebre que sobrepasa los 40 5º C (105° F), o enrojecimiento o inflamación severa donde se le aplicó la inyección  Dígale a delaney médico si usted tiene antecedentes del síndrome de Guillain-Barré después de flori recibido nicholas vacuna antitetánica  · Llame a delaney médico inmediatamente si usted se desmaya o tiene [employer] vista, entumecimiento o cosquilleo en randall brazos, og o pies o sufre nicholas convulsión después de recibir esta vacuna  · Informe a delaney médico si tiene nicholas alergia al látex   Las [employer] pueden que contengan caucho de látex natural seco   · Esta vacuna no sirve para tratar [employer]  Si usted tiene Pitney Connor de difteria, tétanos, o tos ferina, necesitará un medicamento para tratar la infección  Efectos secundarios que pueden presentarse ariel el uso de jasmyne medicamento:   Consulte inmediatamente con el médico si nota cualquiera de estos efectos secundarios:  · Reacción alérgica: Comezón o ronchas, hinchazón del anne-marie o las og, hinchazón u hormigueo en la boca o garganta, opresión en el pecho, dificultad para respirar  · Cambios en la visión  · Fiebre por encima de los 39 4 grados C (105 grados F)  · Desvanecimientos o desmayos  · Pérdida de la sensación, hormigueo o ardor en las og, los brazos, las piernas o los pies  · Convulsiones  · TXU Danae entumecimiento o debilidad en randall brazos o piernas  · Dolor intenso, enrojecimiento o inflamación donde le aplicaron la inyección  Consulte con el médico si nota los siguientes efectos secundarios menos graves:   · Dolor de patti  · Dolor moderado, enrojecimiento o inflamación donde le aplicaron la inyección  · [place], vómito, diarrea o dolor de estómago  · Cansancio  Consulte con el médico si nota otros efectos secundarios que tavon son causados por jasmyne medicamento  Llame a gilbetr médico para consultarle Tsering Luong puede notificar randall efectos secundarios al FDA al [phone]  © 2017 2600 Justin Gardiner Information is for End User's use only and may not be sold, redistributed or otherwise used for commercial purposes  Esta información es sólo para uso en educación  Gilbert intención no es darle un consejo médico sobre enfermedades o tratamientos  Colsulte con gilbert Farida Poncho farmacéutico antes de seguir cualquier régimen médico para saber si es seguro y efectivo para usted         Vacuna contra la gripe   CUIDADO AMBULATORIO:   La vacuna contra la influenza  es nicholas inyección que se aplica para ayudar en la prevención de la influenza (gripe)  La influenza es causada por un virus  El virus se propaga de persona a persona por medio de la tos y los estornudos  Varios tipos de virus causan la influenza  Debido a que los virus [place] con el Maria Elena, es necesaria la producción de nuevas vacunas cada año  La vacuna comienza la protección contra la influenza aproximadamente 2 semanas después de recibirla  La vacuna antigripal suele inyectarse en el brazo  Podría aplicarse en delaney muslo  Es posible que le administren nicholas vacuna hecha con virus débiles o muertos  Llame al 911 en adam de presentar lo siguiente:   · Delaney boca y garganta están inflamadas  · Usted tiene sibilancias o dificultad para respirar  · Usted tiene dolor en el pecho o delaney corazón está latiendo más rápido de lo que es normal para usted  · Usted siente que se va a desmayar  Busque atención médica de inmediato si:   · Delaney anne-marie está connell o inflamado  · Usted tiene urticaria que se propaga por todo delaney cuerpo  · Usted se siente débil o mareado  Pregúntele a delaney Chele Jacqueline vitaminas y minerales son adecuados para usted  · Usted tiene [employer], enrojecimiento o inflamación alrededor del área donde le aplicaron la inyección  · Usted tiene preguntas o inquietudes sobre la vacuna contra la influenza  Cuándo ponerse la vacuna contra la influenza:  La vacuna antigripal se ofrece cada año empezando en septiembre u octubre  Se recomienda ser vacunado contra la gripe tan pronto esté disponible  Los W [employer] 6 meses y 6 años de edad deben recibir 2 dosis ariel el primer año después de flori recibido la vacuna  Las 2 dosis deben recibirse con nicholas diferencia de 4 semanas russell mínimo  Es mejor si se aplica el mismo tipo de vacuna ambas veces  Luego, el bhavani puede recibir 1 dosis [employer]  Los niños de 9 años o mayores deben recibir 1 dosis [employer]          Quiénes deben recibir la vacuna contra la influenza:   · Bebés de 6 meses o más    · Cualquier adulto saludable a quien le gustaría reducir el riesgo de contraer la gripe    · Cualquier persona que vive con, o provee cuidado a niños menores de 5 años de edad     · Personal en el felipa de la mynor    · Cualquier persona que viva en centros de convalecencia de kelly plazo    · Cualquier persona que sufra de problemas de mynor crónicos, russell asma, diabetes, o trastornos en la nahomi    · Cualquier persona con un sistema inmunitario débil    · Mujeres embarazadas o que están planificando quedar embarazadas ariel la temporada de la gripe  [employer] no deben recibir la vacuna contra la influenza:  Si tiene alergia a los [place], pregúntele a delaney médico si es seguro recibir la inyección contra la influenza  Un médico deberá controlarlo de cerca mientras recibe la vacuna y ariel nicholas hora o más tras haberla recibido  [employer] no deben recibir la vacuna contra la influenza:  · Bebés menores de 6 meses     · Cualquier persona que haya sufrido incholas reacción alérgica a la vacuna contra la gripe    · Cualquier persona que esté enferma o tenga fiebre    · Cualquier persona que haya recibido un diagnóstico del síndrome de Guillain-[place] dentro de las primeras 6 semanas de flori recibido nihcolas vacuna contra la gripe    · Cualquier persona alérgica al timerosal (maria dolores)  Los riesgos de la vacuna contra la gripe:  La vacuna contra la influenza podría causar síntomas leves, russell fiebre, dolor de [place] y servando musculares  También es posible que provoque sensibilidad o enrojecimiento de leve a moderado en el área donde le aplicaron la inyección  El aerosol nasal podría causar fiebre, congestión o flujo nasal, dolor de patti, servando musculares o vómito  Usted aún podría contraer la gripe después de recibir la vacuna contra la influenza  Si usted es alérgico a los [place], pregunte acerca de nicholas vacuna sin huevo  Usted podría tener nicholas reacción alérgica a la vacuna  [place] puede poner en peligro delaney alexa     Acuda a randall consultas de control con delaney médico según le indicaron  Anote randall preguntas para que se acuerde de hacerlas ariel randall visitas  © 2017 2600 Justin Gardiner Information is for End User's use only and may not be sold, redistributed or otherwise used for commercial purposes  All illustrations and images included in CareNotes® are the copyrighted property of A D A M , Inc  or Chris Edge  Esta información es sólo para uso en educación  Gilbert intención no es darle un consejo médico sobre enfermedades o tratamientos  Colsulte con gilbert Evelina Points farmacéutico antes de seguir cualquier régimen médico para saber si es seguro y efectivo para usted  TDAP:  Vacuna de refuerzo contra la Difteria/tos ferina/tétanos (Por inyección)   Protege contra las infecciones causadas por el tétanos (trismo), la difteria o la pertusis (tos [place])  Esta es nicholas vacuna de refuerzo  Vianey(s) : Adacel, Boostrix   Existen muchas otras marcas de Waldemar  Jasmyne medicamento no debe ser usado cuando:   Usted no debe recibir esta vacuna si alguna vez ha tenido nicholas reacción alérgica a la vacuna contra el tétanos, la difteria o tos ferina por separado o en combinación  Usted no debe recibir esta vacuna si ha tenido convulsiones, cambios del Alex mental o cualquier otra reacción grave dentro de los 7 días de flori recibido nicholas vacuna contra la tos [place]  Forma de usar jasmyne medicamento:   Inyectable  · Erving Grand enfermera u otro médico le administrará jasmyne medicamento  · Gilbert médico le recetará gilbert dosis exacta y le indicará la frecuencia con la que debe administrarse  Jasmyne medicamento se administra mediante nicholas inyección en ana maría de randall músculos  · Usted podría recibir otras vacunas al mismo tiempo que reciba [place], iban en nicholas samantha distinta del cuerpo  Usted debe recibir las instrucciones para el paciente para todas las vacunas  Coméntele a gilbert médico o enfermera cualquier pregunta que tenga al respecto    · Maryana y siga las instrucciones para el paciente que vienen con el medicamento  Hable con delaney médico o farmacéutico si tiene alguna pregunta  Medicamentos y [employer] que debe evitar:   Consulte con delaney médico o farmacéutico antes de usar cualquier medicamento, incluyendo los que compra sin receta médica, las vitaminas y los productos herbales  · Asegúrese de informarle a delaney médico si usted está recibiendo un tratamiento o medicamento que debilita delaney sistema inmunológico  Ésto incluye la radioterapia, medicamentos esteroides (russell dexametasona, hidrocortisona, metilprednisolona, prednisolona, prednisona, Medrol®), o medicamentos contra el cáncer  Precauciones ariel el uso de jasmyne medicamento:   · Asegúrese que delaney médico sepa si usted está embarazada o lactando o sufre de epilepsia, un sistema inmunológico débil o un antecedente de un derrame cerebral  Informe a delaney médico si usted está enfermo o tiene fiebre  · Avísele a delaney médico acerca de cualquier reacción que usted tenga después de flori recibido nicholas vacuna  Ésto incluye desmayos, convulsiones, nicholas fiebre que sobrepasa los 40 5º C (105° F), o enrojecimiento o inflamación severa donde se le aplicó la inyección  Dígale a delaney médico si usted tiene antecedentes del síndrome de Guillain-Barré después de flori recibido nicholas vacuna antitetánica  · Llame a delaney médico inmediatamente si usted se desmaya o tiene [employer] vista, entumecimiento o cosquilleo en randall brazos, og o pies o sufre nicholas convulsión después de recibir esta vacuna  · Informe a delaney médico si tiene nicholas alergia al látex  Las jeringas pueden que contengan caucho de látex natural seco   · Esta vacuna no sirve para tratar [employer]  Si usted tiene Pitney Connor de difteria, tétanos, o tos ferina, necesitará un medicamento para tratar la infección    Efectos secundarios que pueden presentarse ariel el uso de jasmyne medicamento:   Consulte inmediatamente con el médico si nota cualquiera de estos efectos secundarios:  · Reacción alérgica: Naveed Loser o ronchas, hinchazón del anne-marie o las [address], hinchazón u hormigueo en la boca o garganta, opresión en el pecho, dificultad para respirar  · Cambios en la visión  · Fiebre por encima de los 39 4 grados C (105 grados F)  · Desvanecimientos o desmayos  · Pérdida de la sensación, hormigueo o ardor en las og, los brazos, las piernas o los pies  · Convulsiones  · [employer] Danae entumecimiento o debilidad en randall brazos o piernas  · Dolor intenso, enrojecimiento o inflamación donde le aplicaron la inyección  Consulte con el médico si nota los siguientes efectos secundarios menos graves:   · Dolor de patti  · Dolor moderado, enrojecimiento o inflamación donde le aplicaron la inyección  · [place], vómito, diarrea o dolor de estómago  · Cansancio  Consulte con el médico si nota otros efectos secundarios que tavon son causados por jasmyne medicamento  Llame a gilbert médico para consultarle Tsering Luong puede notificar randall efectos secundarios al FDA al [id number]  © 2017 2600 Justin Gardiner Information is for End User's use only and may not be sold, redistributed or otherwise used for commercial purposes  Esta información es sólo para uso en educación  Gilbert intención no es darle un consejo médico sobre enfermedades o tratamientos  Colsulte con gilbert Florecita Snell farmacéutico antes de seguir cualquier régimen médico para saber si es seguro y efectivo para usted

## 2018-11-20 NOTE — LETTER
Ron Nyhan  1985  [address] [place] [id number]        11/20/18      Franne Nyhan is a patient at our facility  Franne Nyhan Estimated Date of Delivery: 07/04/2019  Any questions or concerns, please feel free to contact our office      Sincerely,    Los De Guzman RN     [facility]   [address]   [place], [address] [facility]   [phone]

## 2018-11-20 NOTE — LETTER
Prince Bay  1985  [address] [place] [address]  [address] [id number]    Dear Prince Bay,      11/20/18        Your employee is a patient at the [facility], [address]  We recommend that all pregnant women:    1  Have a well-ventilated workspace  2  Wear low-heeled shoes  3  Work no more than 40 hours per week  4  Have a 15 minute break every 2 hours and at least 30 minutes for a meal break  5  Use good body mechanics by bending at your knees to avoid back strain and lift no more than 20 pounds without assistance  6  Have ready access to bathrooms and water  She may continue to work until her due date unless medical complications arise  We anticipate she may return to work in 6-8 weeks after delivery       Sincerely,    Janna Mota RN

## 2018-11-20 NOTE — LETTER
Rowena Landaverde  1985  [address] [place] [phone]       11/20/18          Rowena Landaverde is a patient and under our care in our office  Rommel Lin Estimated Date of Delivery: 07/04/2019  Any questions or concerns feel free to contact our office       Thank you,    Lisa Dumont RN    [facility]  [address] [place] [address]  [address] [facility] [address] [place] [address]/[facility]   [phone]
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
## 2018-11-20 NOTE — LETTER
Radha Barrios  1985  [address] [place] [phone]          11/20/18    We have had several requests from local dentist requesting permission to perform procedures on our patients who are pregnant  We wish to respond with this letter regarding some of the more routine procedures that we have been asked about  The following procedures may be performed on our obstetric patients:   1  Administration of local anesthesia   2  Administration of antibiotics such as PCN, Ampicillin, and Erythromycin  3  Administration of pain medications such as Tylenol, Tylenol with codeine, and if needed Percocet  4  Shielded X-rays    Should you have any questions, please do not hesitate to contact at [phone]          Sincerely,    [address]  [phone]

## 2018-11-20 NOTE — PROGRESS NOTES
ROCKY MET WITH 34 Y/O- S- G4:P1:AB2-  [place] SPEAKING WOMAN FOR ASSESS  PT RESIDES WITH FOB AND THEIR 5 1/1 Y/O SON  PT REPORTED PREGNANCY IS PLANNED  PT DENIES ANY USAGE OF DRUG, ALCOHOL OR SMOKING  DENIES [address] HX  PT MET WITH FINANCIAL COUNSELOR FOR ASSISTANCE  WILL CALL [facility] FOR APPOINTMENT  PT PRESENT OPEN  VERBALIZED STILL HAS SOME MILD ANXIETY WHEN MEETING WITH PROVIDER AFTER HER DAUGHTER'S STILL BIRTH ON 2010  SUPPORTIVE COUNSELING GIVEN  PT VERBALIZED INTEREST ON SEEN ONE PROVIDER  ROCKY DISCUSSED CASE WITH NURSE COORDINATOR, PT CASE  WILL BE PRESENTED TO CRNP FOR FOLLOW UP  PT DENIES OTHER CONCERN AT THIS TIME  ROCKY ENCOURAGED PT TO CALL AT ANY TIME NEEDED

## 2018-11-27 ENCOUNTER — APPOINTMENT (OUTPATIENT)
Dept: LAB | Facility: HOSPITAL | Age: 33
End: 2018-11-27

## 2018-11-27 ENCOUNTER — TRANSCRIBE ORDERS (OUTPATIENT)
Dept: ADMINISTRATIVE | Facility: HOSPITAL | Age: 33
End: 2018-11-27

## 2018-11-27 DIAGNOSIS — Z86.32 HISTORY OF GESTATIONAL DIABETES: ICD-10-CM

## 2018-11-27 DIAGNOSIS — Z3A.01 LESS THAN 8 WEEKS GESTATION OF PREGNANCY: ICD-10-CM

## 2018-11-27 DIAGNOSIS — Z34.91 FIRST TRIMESTER PREGNANCY: ICD-10-CM

## 2018-11-27 DIAGNOSIS — E03.9 HYPOTHYROID IN PREGNANCY, ANTEPARTUM, FIRST TRIMESTER: ICD-10-CM

## 2018-11-27 DIAGNOSIS — E03.9 HYPOTHYROID IN PREGNANCY, ANTEPARTUM, FIRST TRIMESTER: Primary | ICD-10-CM

## 2018-11-27 DIAGNOSIS — O99.281 HYPOTHYROID IN PREGNANCY, ANTEPARTUM, FIRST TRIMESTER: ICD-10-CM

## 2018-11-27 DIAGNOSIS — Z34.91 FIRST TRIMESTER PREGNANCY: Primary | ICD-10-CM

## 2018-11-27 DIAGNOSIS — O99.281 HYPOTHYROID IN PREGNANCY, ANTEPARTUM, FIRST TRIMESTER: Primary | ICD-10-CM

## 2018-11-27 DIAGNOSIS — Z3A.08 8 WEEKS GESTATION OF PREGNANCY: Primary | ICD-10-CM

## 2018-11-27 DIAGNOSIS — Z34.91 PREGNANT AND NOT YET DELIVERED IN FIRST TRIMESTER: ICD-10-CM

## 2018-11-27 LAB
ABO GROUP BLD: NORMAL
ANION GAP SERPL CALCULATED.3IONS-SCNC: 9 MMOL/L (ref 4–13)
BASOPHILS # BLD AUTO: 0.03 THOUSANDS/ΜL (ref 0–0.1)
BASOPHILS NFR BLD AUTO: 0 % (ref 0–1)
BILIRUB UR QL STRIP: NEGATIVE
BLD GP AB SCN SERPL QL: NEGATIVE
BUN SERPL-MCNC: 9 MG/DL (ref 5–25)
CALCIUM SERPL-MCNC: 8.7 MG/DL (ref 8.3–10.1)
CHLORIDE SERPL-SCNC: 102 MMOL/L (ref 100–108)
CLARITY UR: NORMAL
CO2 SERPL-SCNC: 28 MMOL/L (ref 21–32)
COLOR UR: YELLOW
CREAT SERPL-MCNC: 0.51 MG/DL (ref 0.6–1.3)
EOSINOPHIL # BLD AUTO: 0.07 THOUSAND/ΜL (ref 0–0.61)
EOSINOPHIL NFR BLD AUTO: 1 % (ref 0–6)
ERYTHROCYTE [DISTWIDTH] IN BLOOD BY AUTOMATED COUNT: 13.7 % (ref 11.6–15.1)
EXTERNAL HIV-1 ANTIBODY: NEGATIVE
GFR SERPL CREATININE-BSD FRML MDRD: 128 ML/MIN/1.73SQ M
GLUCOSE P FAST SERPL-MCNC: 85 MG/DL (ref 65–99)
GLUCOSE UR STRIP-MCNC: NEGATIVE MG/DL
HBV SURFACE AG SER QL: NORMAL
HCT VFR BLD AUTO: 39.6 % (ref 34.8–46.1)
HGB BLD-MCNC: 12.7 G/DL (ref 11.5–15.4)
HGB UR QL STRIP.AUTO: NEGATIVE
IMM GRANULOCYTES # BLD AUTO: 0.05 THOUSAND/UL (ref 0–0.2)
IMM GRANULOCYTES NFR BLD AUTO: 1 % (ref 0–2)
KETONES UR STRIP-MCNC: NEGATIVE MG/DL
LEUKOCYTE ESTERASE UR QL STRIP: NEGATIVE
LYMPHOCYTES # BLD AUTO: 1.88 THOUSANDS/ΜL (ref 0.6–4.47)
LYMPHOCYTES NFR BLD AUTO: 19 % (ref 14–44)
MCH RBC QN AUTO: 28.5 PG (ref 26.8–34.3)
MCHC RBC AUTO-ENTMCNC: 32.1 G/DL (ref 31.4–37.4)
MCV RBC AUTO: 89 FL (ref 82–98)
MONOCYTES # BLD AUTO: 0.51 THOUSAND/ΜL (ref 0.17–1.22)
MONOCYTES NFR BLD AUTO: 5 % (ref 4–12)
NEUTROPHILS # BLD AUTO: 7.55 THOUSANDS/ΜL (ref 1.85–7.62)
NEUTS SEG NFR BLD AUTO: 74 % (ref 43–75)
NITRITE UR QL STRIP: NEGATIVE
NRBC BLD AUTO-RTO: 0 /100 WBCS
PH UR STRIP.AUTO: 6.5 [PH] (ref 4.5–8)
PLATELET # BLD AUTO: 284 THOUSANDS/UL (ref 149–390)
PMV BLD AUTO: 9.5 FL (ref 8.9–12.7)
POTASSIUM SERPL-SCNC: 3.5 MMOL/L (ref 3.5–5.3)
PROT UR STRIP-MCNC: NEGATIVE MG/DL
RBC # BLD AUTO: 4.46 MILLION/UL (ref 3.81–5.12)
RH BLD: POSITIVE
RUBV IGG SERPL IA-ACNC: >175 IU/ML
SODIUM SERPL-SCNC: 139 MMOL/L (ref 136–145)
SP GR UR STRIP.AUTO: 1.02 (ref 1–1.03)
SPECIMEN EXPIRATION DATE: NORMAL
T4 FREE SERPL-MCNC: 0.72 NG/DL (ref 0.76–1.46)
T4 SERPL-MCNC: 9.5 UG/DL (ref 4.7–13.3)
TSH SERPL DL<=0.05 MIU/L-ACNC: 14.05 UIU/ML (ref 0.36–3.74)
UROBILINOGEN UR QL STRIP.AUTO: 0.2 E.U./DL
WBC # BLD AUTO: 10.09 THOUSAND/UL (ref 4.31–10.16)

## 2018-11-27 PROCEDURE — 83036 HEMOGLOBIN GLYCOSYLATED A1C: CPT

## 2018-11-27 PROCEDURE — 84443 ASSAY THYROID STIM HORMONE: CPT

## 2018-11-27 PROCEDURE — 81003 URINALYSIS AUTO W/O SCOPE: CPT | Performed by: NURSE PRACTITIONER

## 2018-11-27 PROCEDURE — 87186 SC STD MICRODIL/AGAR DIL: CPT

## 2018-11-27 PROCEDURE — 84439 ASSAY OF FREE THYROXINE: CPT

## 2018-11-27 PROCEDURE — 86376 MICROSOMAL ANTIBODY EACH: CPT

## 2018-11-27 PROCEDURE — 87077 CULTURE AEROBIC IDENTIFY: CPT

## 2018-11-27 PROCEDURE — 36415 COLL VENOUS BLD VENIPUNCTURE: CPT

## 2018-11-27 PROCEDURE — 84436 ASSAY OF TOTAL THYROXINE: CPT

## 2018-11-27 PROCEDURE — 80081 OBSTETRIC PANEL INC HIV TSTG: CPT

## 2018-11-27 PROCEDURE — 80048 BASIC METABOLIC PNL TOTAL CA: CPT

## 2018-11-27 PROCEDURE — 87086 URINE CULTURE/COLONY COUNT: CPT

## 2018-11-27 RX ORDER — LEVOTHYROXINE SODIUM 0.05 MG/1
50 TABLET ORAL DAILY
Qty: 30 TABLET | Refills: 3 | Status: SHIPPED | OUTPATIENT
Start: 2018-11-27 | End: 2019-01-08 | Stop reason: SDUPTHER

## 2018-11-28 ENCOUNTER — TELEPHONE (OUTPATIENT)
Dept: OBGYN CLINIC | Facility: HOSPITAL | Age: 33
End: 2018-11-28

## 2018-11-28 DIAGNOSIS — O23.41 URINARY TRACT INFECTION IN MOTHER DURING FIRST TRIMESTER OF PREGNANCY: Primary | ICD-10-CM

## 2018-11-28 LAB
EST. AVERAGE GLUCOSE BLD GHB EST-MCNC: 88 MG/DL
HBA1C MFR BLD: 4.7 % (ref 4.2–6.3)
HIV 1+2 AB+HIV1 P24 AG SERPL QL IA: NORMAL
RPR SER QL: NORMAL
THYROPEROXIDASE AB SERPL-ACNC: 13 IU/ML (ref 0–34)

## 2018-11-28 RX ORDER — NITROFURANTOIN 25; 75 MG/1; MG/1
100 CAPSULE ORAL 2 TIMES DAILY
Qty: 14 CAPSULE | Refills: 0 | Status: SHIPPED | OUTPATIENT
Start: 2018-11-28 | End: 2018-12-05

## 2018-11-28 NOTE — TELEPHONE ENCOUNTER
I called pt, informed her of her UTI and told her that prescription for [address] has been sent to her [address]  Instructed her to take the [address] exactly as it is ordered, even if she starts feeling better  I also spoke to pt about her thyroid surgery  Pt states she had her thyroid surgery in July, 2018 at [facility]  Pt to come in to sign Records Release form so her records can be obtained

## 2018-11-29 LAB — BACTERIA UR CULT: ABNORMAL

## 2018-12-04 ENCOUNTER — OFFICE VISIT (OUTPATIENT)
Dept: OBGYN CLINIC | Facility: HOSPITAL | Age: 33
End: 2018-12-04

## 2018-12-04 VITALS
DIASTOLIC BLOOD PRESSURE: 74 MMHG | HEART RATE: 87 BPM | SYSTOLIC BLOOD PRESSURE: 111 MMHG | BODY MASS INDEX: 26.9 KG/M2 | HEIGHT: 60 IN | WEIGHT: 137 LBS

## 2018-12-04 DIAGNOSIS — Z11.3 SCREEN FOR STD (SEXUALLY TRANSMITTED DISEASE): ICD-10-CM

## 2018-12-04 DIAGNOSIS — Z01.419 ENCOUNTER FOR ROUTINE GYNECOLOGICAL EXAMINATION WITH PAPANICOLAOU SMEAR OF CERVIX: ICD-10-CM

## 2018-12-04 DIAGNOSIS — Z3A.09 9 WEEKS GESTATION OF PREGNANCY: Primary | ICD-10-CM

## 2018-12-04 PROCEDURE — 99213 OFFICE O/P EST LOW 20 MIN: CPT | Performed by: OBSTETRICS & GYNECOLOGY

## 2018-12-04 PROCEDURE — G0145 SCR C/V CYTO,THINLAYER,RESCR: HCPCS | Performed by: OBSTETRICS & GYNECOLOGY

## 2018-12-04 PROCEDURE — 87491 CHLMYD TRACH DNA AMP PROBE: CPT | Performed by: OBSTETRICS & GYNECOLOGY

## 2018-12-04 PROCEDURE — 87624 HPV HI-RISK TYP POOLED RSLT: CPT | Performed by: OBSTETRICS & GYNECOLOGY

## 2018-12-04 PROCEDURE — 87591 N.GONORRHOEAE DNA AMP PROB: CPT | Performed by: OBSTETRICS & GYNECOLOGY

## 2018-12-04 NOTE — PROGRESS NOTES
Assessment/Plan:      Diagnoses and all orders for this visit:    9 weeks gestation of pregnancy  -     Ambulatory Referral to Maternal Fetal Medicine; Future  Secondary to multiple intramural fibroids unable to have optimal measurement for determination of EGA which was about 9 5 weeks  Patient to have formal dating US at [facility]  PN panel reviewed     Desires genetic screening and has appt for it at [facility] later this month  Screen for STD (sexually transmitted disease)  -     Chlamydia/GC amplified DNA by PCR    Encounter for routine gynecological examination with Papanicolaou smear of cervix  -     Liquid-based pap, screening        Hypothyroidism secondary to partial thyroidectomy  Currently on 30 mcg of synthroid daily  She will need new TSH in 4 weeks  Denies any symptoms  Release form to be signed today for details on surgery  UTI in pregnancy:   She is currently on macrobid and denies symptoms  She will need test of cure on next visit  Subjective:  Doing well, denies VB, LOF or contractions  Denies nausea or vomiting  No other complaints  Patient ID: Cain Perez is a 29 y o  female  HPI  28 yo [id number] here for PN H&P  She has hx of demise at 42 weeks and then a term delivery at 43 weeks  She states she has had gDM on her two prior pregnancies but denies pre-gestational diabetes  HA1C as part PN panel workup was 4 7  She also had partial thyroidectomy 3 months ago  She is currently on synthroid for a TSH of 14 on her PN workup She is on 30 mcg daily and denies any symptoms  Patient will sign release form to obtain op report  Cannot remember when was the last time she had a pap smear  Review of Systems   Constitutional: Negative  HENT: Negative  Respiratory: Negative  Cardiovascular: Negative  Gastrointestinal: Negative  Genitourinary: Negative  Musculoskeletal: Negative  All other systems reviewed and are negative          Objective:     Physical Exam Constitutional: She is oriented to person, place, and time  She appears well-developed and well-nourished  No distress  HENT:   Head: Normocephalic and atraumatic  Eyes: Pupils are equal, round, and reactive to light  Conjunctivae are normal    Neck: Normal range of motion  Neck supple  No JVD present  No thyromegaly present  Cardiovascular: Normal rate, regular rhythm and normal heart sounds  Exam reveals no friction rub  No murmur heard  Pulmonary/Chest: Effort normal and breath sounds normal  No respiratory distress  She has no wheezes  She has no rales  Abdominal: Soft  Bowel sounds are normal  She exhibits no distension  There is no tenderness  There is no rebound  Genitourinary: Vagina normal and uterus normal    Genitourinary Comments: Cervix closed, no masses   Musculoskeletal: Normal range of motion  She exhibits no edema, tenderness or deformity  Lymphadenopathy:     She has no cervical adenopathy  Neurological: She is alert and oriented to person, place, and time  Skin: She is not diaphoretic         /74 (BP Location: Left arm, Patient Position: Sitting, Cuff Size: Standard)   Pulse 87   Ht 5' (1 524 m)   Wt 62 1 kg (137 lb)   LMP 08/26/2018   BMI 26 76 kg/m²

## 2018-12-05 LAB
C TRACH DNA SPEC QL NAA+PROBE: NEGATIVE
HPV HR 12 DNA CVX QL NAA+PROBE: NEGATIVE
HPV16 DNA CVX QL NAA+PROBE: NEGATIVE
HPV18 DNA CVX QL NAA+PROBE: NEGATIVE
N GONORRHOEA DNA SPEC QL NAA+PROBE: NEGATIVE

## 2018-12-07 LAB
LAB AP GYN PRIMARY INTERPRETATION: NORMAL
Lab: NORMAL

## 2018-12-14 ENCOUNTER — ULTRASOUND (OUTPATIENT)
Dept: PERINATAL CARE | Facility: CLINIC | Age: 33
End: 2018-12-14

## 2018-12-14 VITALS
BODY MASS INDEX: 28 KG/M2 | HEIGHT: 60 IN | DIASTOLIC BLOOD PRESSURE: 78 MMHG | SYSTOLIC BLOOD PRESSURE: 127 MMHG | WEIGHT: 142.6 LBS | HEART RATE: 83 BPM

## 2018-12-14 DIAGNOSIS — Z3A.11 11 WEEKS GESTATION OF PREGNANCY: ICD-10-CM

## 2018-12-14 DIAGNOSIS — Z36.82 ENCOUNTER FOR NUCHAL TRANSLUCENCY TESTING: ICD-10-CM

## 2018-12-14 DIAGNOSIS — D25.9 UTERINE FIBROIDS AFFECTING PREGNANCY IN FIRST TRIMESTER: Primary | ICD-10-CM

## 2018-12-14 DIAGNOSIS — O34.11 UTERINE FIBROIDS AFFECTING PREGNANCY IN FIRST TRIMESTER: Primary | ICD-10-CM

## 2018-12-14 PROCEDURE — 99212 OFFICE O/P EST SF 10 MIN: CPT | Performed by: OBSTETRICS & GYNECOLOGY

## 2018-12-14 PROCEDURE — 76813 OB US NUCHAL MEAS 1 GEST: CPT | Performed by: OBSTETRICS & GYNECOLOGY

## 2018-12-14 PROCEDURE — 76801 OB US < 14 WKS SINGLE FETUS: CPT | Performed by: OBSTETRICS & GYNECOLOGY

## 2018-12-14 NOTE — PROGRESS NOTES
The patient was seen today for an ultrasound  Please see ultrasound report (located under Ob Procedures) for additional details  Thank you very much for allowing us to participate in the care of this very nice patient  Should you have any questions, please do not hesitate to contact me  Devang Luna MD [id number] [facility]  Attending Physician, Shira

## 2019-01-07 DIAGNOSIS — O99.281 HYPOTHYROID IN PREGNANCY, ANTEPARTUM, FIRST TRIMESTER: Primary | ICD-10-CM

## 2019-01-07 DIAGNOSIS — E03.9 HYPOTHYROID IN PREGNANCY, ANTEPARTUM, FIRST TRIMESTER: Primary | ICD-10-CM

## 2019-01-08 ENCOUNTER — ROUTINE PRENATAL (OUTPATIENT)
Dept: OBGYN CLINIC | Facility: CLINIC | Age: 34
End: 2019-01-08

## 2019-01-08 ENCOUNTER — PATIENT OUTREACH (OUTPATIENT)
Dept: OBGYN CLINIC | Facility: CLINIC | Age: 34
End: 2019-01-08

## 2019-01-08 ENCOUNTER — APPOINTMENT (OUTPATIENT)
Dept: LAB | Facility: HOSPITAL | Age: 34
End: 2019-01-08
Payer: COMMERCIAL

## 2019-01-08 VITALS
HEART RATE: 86 BPM | SYSTOLIC BLOOD PRESSURE: 112 MMHG | HEIGHT: 60 IN | DIASTOLIC BLOOD PRESSURE: 60 MMHG | BODY MASS INDEX: 28.47 KG/M2 | WEIGHT: 145 LBS

## 2019-01-08 DIAGNOSIS — O99.281 HYPOTHYROID IN PREGNANCY, ANTEPARTUM, FIRST TRIMESTER: ICD-10-CM

## 2019-01-08 DIAGNOSIS — E03.9 HYPOTHYROID IN PREGNANCY, ANTEPARTUM, SECOND TRIMESTER: Primary | ICD-10-CM

## 2019-01-08 DIAGNOSIS — Z3A.15 15 WEEKS GESTATION OF PREGNANCY: ICD-10-CM

## 2019-01-08 DIAGNOSIS — O34.12 LEIOMYOMA OF UTERUS AFFECTING PREGNANCY IN SECOND TRIMESTER: ICD-10-CM

## 2019-01-08 DIAGNOSIS — O99.282 HYPOTHYROID IN PREGNANCY, ANTEPARTUM, SECOND TRIMESTER: Primary | ICD-10-CM

## 2019-01-08 DIAGNOSIS — D25.9 LEIOMYOMA OF UTERUS AFFECTING PREGNANCY IN SECOND TRIMESTER: ICD-10-CM

## 2019-01-08 DIAGNOSIS — E03.9 HYPOTHYROID IN PREGNANCY, ANTEPARTUM, FIRST TRIMESTER: ICD-10-CM

## 2019-01-08 DIAGNOSIS — O23.41 URINARY TRACT INFECTION IN MOTHER DURING FIRST TRIMESTER OF PREGNANCY: ICD-10-CM

## 2019-01-08 LAB
T4 FREE SERPL-MCNC: 0.88 NG/DL (ref 0.76–1.46)
TSH SERPL DL<=0.05 MIU/L-ACNC: 4.68 UIU/ML (ref 0.36–3.74)

## 2019-01-08 PROCEDURE — 84439 ASSAY OF FREE THYROXINE: CPT

## 2019-01-08 PROCEDURE — 84443 ASSAY THYROID STIM HORMONE: CPT

## 2019-01-08 PROCEDURE — 99213 OFFICE O/P EST LOW 20 MIN: CPT | Performed by: NURSE PRACTITIONER

## 2019-01-08 PROCEDURE — 87086 URINE CULTURE/COLONY COUNT: CPT | Performed by: NURSE PRACTITIONER

## 2019-01-08 PROCEDURE — 36415 COLL VENOUS BLD VENIPUNCTURE: CPT

## 2019-01-08 RX ORDER — LEVOTHYROXINE SODIUM 0.05 MG/1
50 TABLET ORAL DAILY
Qty: 30 TABLET | Refills: 1 | Status: SHIPPED | OUTPATIENT
Start: 2019-01-08 | End: 2019-01-08

## 2019-01-08 RX ORDER — LEVOTHYROXINE SODIUM 0.07 MG/1
75 TABLET ORAL DAILY
Qty: 30 TABLET | Refills: 1 | Status: SHIPPED | OUTPATIENT
Start: 2019-01-08 | End: 2019-02-05 | Stop reason: SDUPTHER

## 2019-01-08 NOTE — PATIENT INSTRUCTIONS
El embarazo de la semana 15 a la 18   LO QUE NECESITA SABER:   ¿Qué cambios están ocurriendo en mi cuerpo? Ahora que usted está en delaney hanny trimestre, tiene [place]  Es posible que también sienta más [place] de lo normal  Usted podría comenzar a experimentar otros síntomas, russell acidez o mareos  Es posible que usted esté aumentando de ½ a 1 kim por semana, y que delaney embarazo se empiece a notar  Posiblemente usted necesite comenzar a usar ropa de maternidad  ¿Cómo me jessenia cuidar en esta etapa de mi embarazo? · Controle la acidez  comiendo 4 o 5 comidas pequeñas cada día en vez de comidas grandes  Evite los alimentos picantes  Evite comer charlie antes de irse a la cama  · Controle la náusea y el vómito  Evite los alimentos grasosos y picantes  Coma comidas pequeñas ariel el día en vez de porciones grandes  El jengibre puede ayudar a [employer]  Consulte con delaney médico acerca de otras formas para disminuir las náuseas y el vómito  · Consuma alimentos saludables y variados  Alimentos saludables incluyen frutas, verduras, panes de ken integral, alimentos lácteos bajos en grasa, frijoles, laith magras y pescado  [place] líquidos russell se le haya indicado  Pregunte cuánto líquido debe lisette cada día y cuáles líquidos son los más adecuados para usted  Limite el consumo de cafeína a menos de [place]  Limite el consumo de pescado a 2 porciones cada semana  Escoja pescado con concentraciones bajas de maria dolores russell atún al natural enlatado, camarón, salmón, bacalao o tilapia  No  coma pescado con concentraciones altas de maria dolores russell pez kailee, caballa gigante, freda garrison y tibkristen  · [id number] [place]  Delaney necesidad de ciertas vitaminas y [facility], russell el ácido fólico, aumenta ariel el [facility]  Las vitaminas prenatales proporcionan algunas de las vitaminas y minerales adicionales que usted necesita   5 [facility] ayudar a disminuir el riesgo de ciertos defectos de nacimiento  · No fume  Si usted fuma, nunca es demasiado tarde para dejar de hacerlo  Fumar aumenta el riesgo de aborto espontáneo y otros problemas de mynor ariel delaney Sindy Mangle  Fumar puede causar que delaney bebé nazca antes de tiempo o que pese menos al nacer  Solicite información a delaney médico si usted necesita ayuda para dejar de fumar  · No consuma alcohol  El alcohol pasa de delaney cuerpo al bebé a través de la placenta  Puede afectar el desarrollo del cerebro de delaney bebé y provocar el síndrome de alcoholismo fetal (SAF)  FAS es un tarsha de condiciones que causan [address], de comportamiento y de crecimiento  · Consulte con delaney médico antes de lisette cualquier medicamento  Muchos medicamentos pueden perjudicar a delaney bebé si usted los la nena [address]  No tome ningún medicamento, vitaminas, hierbas o suplementos sin leela consultar con delaney Layla Fee  use drogas ilegales o de la alexander (russell marihuana o cocaína) mientras está embarazada  ¿Cuáles son Corey Mings consejos de seguridad ariel el embarazo? · Evite jacuzzis y saunas  No use un jacuzzi o un sauna mientras usted está embarazada, especialmente ariel el primer trimestre  Los [facility] y los saunas aumentan la temperatura de delaney bebé y el riesgo de defectos de nacimiento  · Evite la toxoplasmosis  [place] es nicholas infección causada por comer carne cruda o estar cerca del excremento de un randell infectado  [place] puede causar malformaciones congénitas, aborto espontáneo y Sanket Schein  Lávese las og después de tocar carne cruda  Asegúrese de que la carne esté tessie cocida antes de comerla  Evite los huevos crudos y la Marie Speck  Use guantes o pida que alguien la ayude a limpiar la caja de arena del randell mientras usted [place] Jester  ¿Qué cambios están ocurriendo con mi bebé?   Para las 18 semanas, delaney bebé podría medir alrededor de 6 pulgadas de kelly desde la adrianna hasta la rabadilla (el cóccix)  Es posible que delaney bebé pese alrededor de 11 onzas  Usted podría sentir los movimientos de delaney bebé aproximadamente a las 18 semanas o después  Podría ser que los primeros movimientos no eriberto tan notorios  Los movimientos podrían sentirse russell nicholas sensación de revoloteo  Delaney bebé también hace movimientos de succión y puede escuchar ciertos sonidos  ¿Qué necesito saber acerca del cuidado prenatal?  Ariel las primeras 29 semanas de delaney embarazo, usted tendrá citas mensuales con delaney médico  Delaney médico le revisará delaney presión arterial y peso  Es posible que también necesite alguno de los siguientes tratamientos:  · Un examen de orina  también podría realizarse para revisarle el azúcar y la proteína  Estas son señales de diabetes gestacional o de infección  · Los análisis de nahomi  se pueden realizar para revisar si tiene signos de anemia (nivel bajo del zack)  · La revisión de la altura del fondo uterino  es nicholas medición del útero para controlar el desarrollo de delaney bebé  [employer] por lo general es igual al número de 11 Estefani Perez que usted tiene de embarazo  · Nicholas ecografía  podría realizarse para revisar el desarrollo de delaney bebé  Es posible que delaney médico pueda decirle cuál es el sexo de delaney bebé ariel la ecografía  · El ritmo cardíaco de delaney bebé  será revisado  ¿Cuándo jessenia buscar atención inmediata? · Usted tiene dolor o cólicos en el abdomen o la parte baja de la espalda  · Usted tiene sangrado vaginal abundante o coágulos  · Le sale un material que parece tejido o coágulos grandes  Recolecte el material y tráigalo con usted  ¿Cuándo jessenia comunicarme con mi médico?   · Usted no puede retener alimentos ni líquidos y está perdiendo [place]  · Usted tiene un sangrado leve  · Usted tiene escalofríos o fiebre      · Usted tiene comezón, ardor o dolor vaginal      · Usted tiene nicholas secreción vaginal amarillenta, verdosa, diana o de [employer] desagradable  · Usted tiene dolor o ardor al Ja Friar, orina menos de lo habitual o tiene [place] rosada o sanguinolenta  · Usted tiene preguntas o inquietudes acerca de delaney condición o cuidado  ACUERDOS SOBRE DELANEY CUIDADO:   Usted tiene el derecho de ayudar a planear delaney cuidado  Aprenda todo lo que pueda sobre delaney condición y russell darle tratamiento  Discuta randall opciones de tratamiento con randall médicos para decidir el cuidado que usted desea recibir  Usted siempre tiene el derecho de rechazar el tratamiento  Esta información es sólo para uso en educación  Delaney intención no es darle un consejo médico sobre enfermedades o tratamientos  Colsulte con delaney Rachelle Alexandre farmacéutico antes de seguir cualquier régimen médico para saber si es seguro y efectivo para usted  © 2017 [facility] Information is for End User's use only and may not be sold, redistributed or otherwise used for commercial purposes  All illustrations and images included in CareNotes® are the copyrighted property of A D A M , Inc  or Chris Edge  Hipotiroidismo en el embarazo   LO QUE NECESITA SABER:   ¿Qué es el hipotiroidismo? El hipotiroidismo es un trastorno que se desarrolla cuando la glándula tiroides produce pocas hormonas tiroidea o no las produce del todo  Las hormonas tiroidea ayudan a controlar la temperatura del cuerpo, el ritmo cardíaco, el crecimiento y la forma en que la persona sube o pierde [place]  ¿Qué causa el hipotiroidismo? Las siguientes condiciones pueden causar o aumentar delaney riesgo de padecer hipotiroidismo:  · Enfermedad autoinmune:  Un problema con el sistema inmunológico puede hacer que delaney cuerpo ataque delaney glándula tiroides  La enfermedad de Hashimoto es la enfermedad autoinmune más común causante del hipotiroidismo  · Medicamentos:  Ciertos medicamentos pueden causar hipotiroidismo  Consulte con delaney médico si alguno de los medicamentos que usted la nena pueden provocarle hipotiroidismo  · Tratamientos:  La terapia con radiación para tratar los cánceres en la patti y el awais pueden destruir la glándula tiroides  La cirugía de la tiroides también lo hace a usted más susceptible a desarrollar hipotiroidismo  · Problemas de la tiroides:  Un historial de hipotiroidismo en el pasado u otros problemas de la tiroides podrían aumentar si riesgo de desarrollar hipotiroidismo ariel el [facility]  Nicholas glándula tiroides engrandecida o inflamada, protuberancias causadas por infecciones, o cáncer de la tiroides pueden afectar el funcionamiento de la misma  · Historial familiar:  Delaney riesgo es aún más elevado si algún miembro de delaney rocael padece de hipotiroidismo o de nicholas enfermedad auto inmune  · Niveles bajos de yodo:  El yodo es un mineral importante que la glándula tiroides [place] para funcionar correctamente y producir hormonas tiroideas  Los niveles bajos de yodo ariel el embarazo aumentan el riesgo de delaney bebé de desarrollar hipotiroidismo  ¿Cuáles son los signos y síntomas del hipotiroidismo ariel el [facility]? Los signos y síntomas del hipotiroidismo pueden empezar lentamente y es probable que usted no note ningún cambio  · Demasiada fatiga    · Sensibilidad al frío    · Estreñimiento    · Piel seca y escamosa o uñas quebradizas    · Eva muy stubbs    · Dolor o debilidad en los músculos    · Glándula tiroides inflamada     · Depresión o irritabilidad  ¿Cómo se diagnostica el hipotiroidismo? Delaney médico le preguntará acerca de randall síntomas y lo examinará  Es posible que le pregunte qué medicamentos la nena  Rashid Pila sobre delaney historial médico y si alguien en delaney rocael sufre de hipotiroidismo  Se le realizarán pruebas de nahomi para revisar el nivel de delaney hormona tiroidea  ¿Cómo se trata el hipotiroidismo?   El medicamento para la tiroides hará que delaney nivel de hormonas tiroideas regresen a un nivel normal  Solicite más información a delaney médico sobre otros medicamentos que usted podría necesitar  ¿Cuáles son los riesgos del hipotiroidismo ariel Dairl Au? [place] [employer] que el cuerpo necesite cantidades más grandes de la hormona tiroidea y de Misa  Los niveles bajos de la hormona tiroidea podrían causar problemas de mynor serios  Usted puede continuar aumentando de [place]  Puede tener servando en el cuerpo y pensar, moverse y hablar lentamente  Además podría desarrollar mixedema, un trastorno muy peligroso  La Mixedema podría causar inflamación en las piernas, tobillos, pulmones, o alrededor del corazón  Usted podría convulsionar o entrar en estado de coma profundo y morir si no recibe tratamiento médico rápidamente  También podría tener presión arterial elevada y Josefa Mom borrosa y sangrado grave en el [place]  Gilbert hijo puede nacer con defectos congénitos, tener bajo peso al nacer o nacer muerto  Es posible que se le adelante el parto  ¿Cuándo jessenia comunicarme con mi médico?   · Usted pierde peso sin proponérselo  · Usted tiene fiebre  · Usted tiene escalofríos, tos o se siente débil y adolorido  · Tiene dolor, enrojecimiento e inflamación en los músculos y articulaciones  · Usted tiene comezón, hinchazón o sarpullido en gilbert piel  · No tiene más medicamentos para la tiroides o suspendió gilbert uso sin antes consultar con gilbert médico   ¿Cuándo jessenia buscar atención inmediata o llamar al 911? · Usted se desmaya o convulsiona  · Siente que gilbert bebé está inquieto y patea todo el tiempo o al contrario está muy quieto y no se mueve en absoluto  · Tiene servando de pecho repentinos o dificultad para respirar, o inflamación en las piernas, tobillos o pies  · Usted tiene diarrea, temblores o dificultad para dormir  · Se le rompe la marisel, o sangra por la vagina  · Queta signos y síntomas Geraldyne Schanz  ACUERDOS SOBRE GILBERT CUIDADO:   Usted tiene el derecho de ayudar a planear gilbert cuidado  Aprenda todo lo que pueda sobre gilbert condición y russell darle tratamiento  Discuta randall opciones de tratamiento con randall médicos para decidir el cuidado que usted desea recibir  Usted siempre tiene el derecho de rechazar el tratamiento  Esta información es sólo para uso en educación  Gilbert intención no es darle un consejo médico sobre enfermedades o tratamientos  Colsulte con gilbert Niobrara Jewels farmacéutico antes de seguir cualquier régimen médico para saber si es seguro y efectivo para usted  © 2017 [address] Information is for End User's use only and may not be sold, redistributed or otherwise used for commercial purposes  All illustrations and images included in CareNotes® are the copyrighted property of [employer]A [employer] Inc  or Chris Edge

## 2019-01-08 NOTE — PROGRESS NOTES
Patient is Czech-speaking Brandyn Cheek at bedside to translate during today's visit  Patient denies loss of fluid, vaginal bleeding and abdominal pain  Confirms fetal movement, flutters  Tolerating prenatal vitamin and levothyroxine well  Had repeat TSH drawn this morning  Labs pending at today's visit  Hypothyroid due to partial thyroidectomy-patient states part of her thyroid was removed as the believed it was cancerous but pathology was benign  Pap smear reviewed-negative  Gonorrhea chlamydia reviewed-negative  Was seen for an early ultrasound at  Center declined genetic screening as of that time  Reviewed option for quad screen  Will order today  Patient is unsure at this time if she is going to have labs drawn will depend on cost   Patient treated for UTI in pregnancy-will send MOE today   Center early ultrasound reviewed-variable presentation, normal appearing fetal growth and posterior placenta  Ultrasound significant for fibroids  Recommendations reviewed  fetal surveillance beginning at 32 weeks due to history of unexplained stillbirth, TSH levels monthly until normal, anatomy scan at 20 weeks  Plan:  1  Continue prenatal vitamin daily  2  Hypothyroid     - taking levothyroxine 50 mcg daily     - TSH drawn this morning prior to visit-results pending  3  Desires genetic testing-quad screen ordered  4   center follow-up as scheduled  5  UTI in pregnancy-1st trimester     - 18 >100,000 CFU E  Coli- treated     -MOE sent today  6  Maternal fibroids-precautions provided  7  Common discomforts of pregnancy and precautions reviewed    RTO 4 weeks

## 2019-01-08 NOTE — PROGRESS NOTES
SW  MET WITH PT FOR FOLLOW UP AND ASSIST WITH INTERPRETATION DURING PROVIDER ASSESS  PT REPORTED IS DOING WELL  WILL TRY OTC MEDICATION FOR CONGESTION  NO OTHER CONCERN AT THIS TIME  WILL CONTACT SW AS NEEDED

## 2019-01-09 LAB — BACTERIA UR CULT: NORMAL

## 2019-01-17 ENCOUNTER — APPOINTMENT (OUTPATIENT)
Dept: LAB | Facility: HOSPITAL | Age: 34
End: 2019-01-17

## 2019-01-17 ENCOUNTER — ROUTINE PRENATAL (OUTPATIENT)
Dept: OBGYN CLINIC | Facility: CLINIC | Age: 34
End: 2019-01-17

## 2019-01-17 VITALS
SYSTOLIC BLOOD PRESSURE: 122 MMHG | BODY MASS INDEX: 28.47 KG/M2 | HEIGHT: 60 IN | WEIGHT: 145 LBS | HEART RATE: 85 BPM | DIASTOLIC BLOOD PRESSURE: 79 MMHG

## 2019-01-17 DIAGNOSIS — Z34.92 PRENATAL CARE IN SECOND TRIMESTER: Primary | ICD-10-CM

## 2019-01-17 DIAGNOSIS — O99.282 HYPOTHYROID IN PREGNANCY, ANTEPARTUM, SECOND TRIMESTER: ICD-10-CM

## 2019-01-17 DIAGNOSIS — E03.9 HYPOTHYROID IN PREGNANCY, ANTEPARTUM, SECOND TRIMESTER: ICD-10-CM

## 2019-01-17 DIAGNOSIS — Z3A.15 15 WEEKS GESTATION OF PREGNANCY: ICD-10-CM

## 2019-01-17 LAB — TSH SERPL DL<=0.05 MIU/L-ACNC: 2.08 UIU/ML (ref 0.36–3.74)

## 2019-01-17 PROCEDURE — 82105 ALPHA-FETOPROTEIN SERUM: CPT

## 2019-01-17 PROCEDURE — 82677 ASSAY OF ESTRIOL: CPT

## 2019-01-17 PROCEDURE — 36415 COLL VENOUS BLD VENIPUNCTURE: CPT

## 2019-01-17 PROCEDURE — 99213 OFFICE O/P EST LOW 20 MIN: CPT | Performed by: OBSTETRICS & GYNECOLOGY

## 2019-01-17 PROCEDURE — 86336 INHIBIN A: CPT

## 2019-01-17 PROCEDURE — 84702 CHORIONIC GONADOTROPIN TEST: CPT

## 2019-01-17 PROCEDURE — 84443 ASSAY THYROID STIM HORMONE: CPT

## 2019-01-18 NOTE — PROGRESS NOTES
Ramon Pinon is a 29 y o  female being seen today for her obstetrical visit  She is at 16w2d gestation  Patient reports no bleeding, no contractions, no cramping and no leaking  Patient concerned due to "not feeling baby move as much" for the past 2 days  Patient with history of miscarriage  States she is afraid of loosing this pregnancy  Patient had an accident while being 42 weeks pregnant and lost her daughter  Patient states she feels like she is still not over it  Menstrual History:  OB History      Para Term  AB Living    4 2 2   1 1    SAB TAB Ectopic Multiple Live Births    1       1           Patient's last menstrual period was 2018  The following portions of the patient's history were reviewed and updated as appropriate: allergies, current medications, past family history, past medical history, past social history, past surgical history and problem list     Review of Systems  Pertinent items are noted in HPI  Objective     /79   Pulse 85   Ht 5' (1 524 m)   Wt 65 8 kg (145 lb)   LMP 2018   BMI 28 32 kg/m²   Fetal HR: seen on portable U/S  Gen: AAOx3, mild distress  Heart: regular rate and rhythm, normal S1 and S2, no murmurs  Lungs: CTAB  Abd: soft, non tender, non distended, gravid  Ext: no edema or cyanosis  Assessment      Pregnancy 16 and 2/7 weeks     Plan     Problem list reviewed and updated  Discussed with patient she should seek therapy to help overcome her fear of another miscarriage  She will meet with  after the visit  for more information  Patient reported she has referral for quad screen  Follow up in 4 weeks  Miguelito Side

## 2019-01-20 LAB
2ND TRIMESTER 4 SCREEN SERPL-IMP: NORMAL
2ND TRIMESTER 4 SCREEN SERPL-IMP: NORMAL
AFP ADJ MOM SERPL: 1.4
AFP SERPL-MCNC: 47.6 NG/ML
AGE AT DELIVERY: 33.2 YR
FET TS 18 RISK FROM MAT AGE: NORMAL
FET TS 21 RISK FROM MAT AGE: 428
GA METHOD: NORMAL
GA: 16.3 WEEKS
HCG ADJ MOM SERPL: 0.49
HCG SERPL-ACNC: NORMAL MIU/ML
IDDM PATIENT QL: NO
INHIBIN A ADJ MOM SERPL: 0.63
INHIBIN A SERPL-MCNC: 109.59 PG/ML
KARYOTYP BLD/T: NORMAL
MULTIPLE PREGNANCY: NO
NEURAL TUBE DEFECT RISK FETUS: 3600 %
SERVICE CMNT-IMP: NORMAL
TS 18 RISK FETUS: NORMAL
TS 21 RISK FETUS: 8981
U ESTRIOL ADJ MOM SERPL: 0.91
U ESTRIOL SERPL-MCNC: 0.79 NG/ML

## 2019-02-05 ENCOUNTER — ROUTINE PRENATAL (OUTPATIENT)
Dept: OBGYN CLINIC | Facility: CLINIC | Age: 34
End: 2019-02-05

## 2019-02-05 VITALS
BODY MASS INDEX: 28.86 KG/M2 | HEART RATE: 77 BPM | WEIGHT: 147 LBS | HEIGHT: 60 IN | DIASTOLIC BLOOD PRESSURE: 68 MMHG | SYSTOLIC BLOOD PRESSURE: 101 MMHG

## 2019-02-05 DIAGNOSIS — E03.9 HYPOTHYROID IN PREGNANCY, ANTEPARTUM, SECOND TRIMESTER: Primary | ICD-10-CM

## 2019-02-05 DIAGNOSIS — Z3A.19 19 WEEKS GESTATION OF PREGNANCY: ICD-10-CM

## 2019-02-05 DIAGNOSIS — O99.282 HYPOTHYROID IN PREGNANCY, ANTEPARTUM, SECOND TRIMESTER: Primary | ICD-10-CM

## 2019-02-05 LAB
SL AMB  POCT GLUCOSE, UA: NORMAL
SL AMB POCT URINE PROTEIN: NORMAL

## 2019-02-05 PROCEDURE — 81002 URINALYSIS NONAUTO W/O SCOPE: CPT | Performed by: NURSE PRACTITIONER

## 2019-02-05 PROCEDURE — 99213 OFFICE O/P EST LOW 20 MIN: CPT | Performed by: NURSE PRACTITIONER

## 2019-02-05 RX ORDER — LEVOTHYROXINE SODIUM 0.07 MG/1
75 TABLET ORAL DAILY
Qty: 90 TABLET | Refills: 1 | Status: SHIPPED | OUTPATIENT
Start: 2019-02-05 | End: 2019-07-16

## 2019-02-05 NOTE — PROGRESS NOTES
Patient is Estonian-speaking A  Gage Musty at bedside to translate during today's visit  Patient denies loss of fluid, vaginal bleeding and abdominal pain  Confirms fetal movement, flutters  Tolerating prenatal vitamin and levothyroxine well  Has appointment for level 2 ultrasound  Most recent TSH with reflex T4-WNL  Test of cure urine negative  No concerns today  Quad screen screen negative reviewed with patient  Patient was recently seen in office with concerns for decreased fetal movement and increased anxiety given recent miscarriage  Patient states she is feeling better  Plan:  1  Continue prenatal vitamin daily  2  Hypothyroid     - taking levothyroxine 75 mcg daily     - TSH 19-  2 080 normal for 2nd trimester   3   center follow-up as scheduled  4  Maternal fibroids-precautions provided  5  Common discomforts of pregnancy and precautions reviewed    RTO 4 weeks

## 2019-02-05 NOTE — PATIENT INSTRUCTIONS
El embarazo de la semana 19 a la 22   LO QUE NECESITA SABER:   ¿Qué cambios están ocurriendo en mi cuerpo? Ahora que usted está en delaney hanny trimestre, tiene [place]y  Es posible que también sienta más [place] de lo normal  Es posible que usted esté aumentando de ½ a 1 kim por [place], y que delaney embarazo se empiece a notar  Posiblemente usted necesite comenzar a usar ropa de maternidad  Conforme delaney bebé está creciendo, usted podría presentar otros síntomas  Estos podrían incluir dolor corporal o estrías en delaney abdomen, senos, muslos y glúteos  ¿Cómo me jessenia cuidar en esta etapa de mi embarazo? · Consuma alimentos saludables y variados  Alimentos saludables incluyen frutas, verduras, panes de ken integral, alimentos lácteos bajos en grasa, frijoles, laith magras y pescado  Reserve líquidos russell se le haya indicado  Pregunte cuánto líquido debe lisette cada día y cuáles líquidos son los más adecuados para usted  Limite el consumo de cafeína a menos de [address]  Limite el consumo de pescado a 2 porciones cada semana  Escoja pescado con concentraciones bajas de maria dolores russell atún al natural enlatado, camarón, salmón, bacalao o tilapia  No  coma pescado con concentraciones altas de maria dolores russell pez kailee, caballa gigante, pargo rayado y tiburón  · [address] [place]  Delaney necesidad de ciertas vitaminas y [facility], russell el ácido fólico, aumenta ariel el [facility]  Las vitaminas prenatales proporcionan algunas de las vitaminas y minerales adicionales que usted necesita  Las vitaminas prenatales también podrían ayudar a disminuir el riesgo de ciertos defectos de nacimiento  · Consulte con delaney médico acerca de hacer ejercicio  El ejercicio moderado puede ayudarla a mantenerse en forma  Delaney médico la ayudará a planear un programa de ejercicios que sea seguro para usted ariel delaney [place]  · No fume    Si usted fuma, nunca es demasiado tarde para dejar de hacerlo  Fumar aumenta el riesgo de aborto espontáneo y otros problemas de mynor ariel gilbert [place]  Fumar puede causar que gilbert bebé nazca antes de tiempo o que pese menos al nacer  Solicite información a gilbert médico si usted necesita ayuda para dejar de fumar  · No consuma alcohol  El alcohol pasa de gilbert cuerpo al bebé a través de la placenta  Puede afectar el desarrollo del cerebro de gilbert bebé y provocar el síndrome de alcoholismo fetal (SAF)  FAS es un tarsha de condiciones que causan [address], de comportamiento y de crecimiento  · Consulte con gilbert médico antes de lisette cualquier medicamento  Muchos medicamentos pueden perjudicar a gilbert bebé si usted los la nena [address] [place] [address]  No tome ningún medicamento, vitaminas, hierbas o suplementos sin leela consultar con gilbert Anice Cunas  use drogas ilegales o de la alexander (russell marihuana o cocaína) mientras está embarazada  ¿Cuáles son Jossy Shelley consejos de seguridad ariel el embarazo? · Evite jacuzzis y saunas  No use un jacuzzi o un sauna mientras usted está embarazada, especialmente ariel el primer trimestre  Los [facility] y los saunas aumentan la temperatura de gilbert bebé y el riesgo de defectos de nacimiento  · Evite la toxoplasmosis  Pocola es nicholas infección causada por comer carne cruda o estar cerca del excremento de un randell infectado  Pocola puede causar malformaciones congénitas, aborto espontáneo y Sanket Thurmanese las og después de tocar carne cruda  Asegúrese de que la carne esté tessie cocida antes de comerla  Evite los huevos crudos y la Lovell Lusher  Use guantes o pida que alguien la ayude a limpiar la caja de arena del randell mientras usted Serafin Irizarry  ¿Qué cambios están ocurriendo con mi bebé? Para las 22 semanas, gilbert bebé mide alrededor de 8 pulgadas de kelly desde la punta de la patti hasta la rabadilla (parte inferior del bebé)  Gilbert bebé también pesa alrededor de 1 kim   Gilbert bebé se está volviendo [facility] activo  Es posible que pueda sentir a delaney bebé moviéndose dentro de usted para [employer]  Podría ser que los primeros movimientos no eriberto tan notorios  Los movimientos podrían sentirse russell nicholas sensación de revoloteo  Conforme pasa el tiempo, los movimientos de delaney bebé podrían volverse más dwayne y notorios  ¿Qué necesito saber acerca del cuidado prenatal?  Ariel las primeras 29 semanas de delaney embarazo, usted tendrá citas mensuales con delaney médico  Delaney médico le revisará delaney presión arterial y peso  Es posible que también necesite lo siguiente:  · Un examen de orina  también podría realizarse para revisarle el azúcar y la proteína  Estas son señales de diabetes gestacional o de infección  La proteína en delaney orina también podría ser nicholas señal de preeclampsia  La preeclampsia es nicholas condición que puede desarrollarse ariel la semana 21 o después en delaney embarazo  Esta provoca presión arterial neli y [employer] con randall riñones y [place]  · La altura uterina  es nicholas medición del útero para controlar el desarrollo de delaney bebé  [employer] por lo general es igual al número de [address]efani Perez que usted tiene de embarazo  · [place] ecografía del feto  [place] imágenes del bebé dentro de delaney Marlinda Sees  Muestra el desarrollo de delaney bebé  El movimiento y posición del bebé también se pueden observar  Es posible que delaney médico pueda decirle cuál es el sexo de delaney bebé ariel la ecografía  · El ritmo cardíaco de delaney bebé  será revisado  ¿Cuándo jessenia buscar atención inmediata? · Usted presenta un britt dolor de patti que no desaparece  · Usted tiene cambios en la visión nuevos o en aumento, russell visión borrosa o con manchas  · Usted tiene inflamación nueva o creciente en delaney tyra o og  · Usted tiene manchado o sangrado vaginal     · Usted rompe marisel o siente un chorro o gotas de agua tibia que le está bajando por delaney vagina    ¿Cuándo jessenia comunicarme con mi médico?   · Usted tiene calambres, presión o tensión abdominal     · Usted tiene un cambio en la secreción vaginal     · Usted no puede retener alimentos ni líquidos y está perdiendo [place]  · Usted tiene escalofríos o fiebre  · Usted tiene comezón, ardor o dolor vaginal      · Usted tiene nicholas secreción vaginal amarillenta, verdosa, diana o de [employer]  · Usted tiene dolor o ardor al Chris[place]Marylou, orina menos de lo habitual o tiene [place] rosada o sanguinolenta  · Usted tiene preguntas o inquietudes acerca de smith condición o cuidado  ACUERDOS SOBRE SMITH CUIDADO:   Usted tiene el derecho de ayudar a planear smith cuidado  Aprenda todo lo que pueda sobre smith condición y russell darle tratamiento  Discuta randall opciones de tratamiento con randall médicos para decidir el cuidado que usted desea recibir  Usted siempre tiene el derecho de rechazar el tratamiento  Esta información es sólo para uso en educación  Smith intención no es darle un consejo médico sobre enfermedades o tratamientos  Colsulte con smith Florecita Snell farmacéutico antes de seguir cualquier régimen médico para saber si es seguro y efectivo para usted  © 2017 [address] Information is for End User's use only and may not be sold, redistributed or otherwise used for commercial purposes  All illustrations and images included in CareNotes® are the copyrighted property of A D A M , Inc  or Chris Edge

## 2019-02-12 ENCOUNTER — ROUTINE PRENATAL (OUTPATIENT)
Dept: PERINATAL CARE | Facility: CLINIC | Age: 34
End: 2019-02-12

## 2019-02-12 VITALS
WEIGHT: 150 LBS | HEART RATE: 84 BPM | RESPIRATION RATE: 18 BRPM | SYSTOLIC BLOOD PRESSURE: 125 MMHG | HEIGHT: 60 IN | BODY MASS INDEX: 29.45 KG/M2 | DIASTOLIC BLOOD PRESSURE: 73 MMHG

## 2019-02-12 DIAGNOSIS — O99.282 HYPOTHYROID IN PREGNANCY, ANTEPARTUM, SECOND TRIMESTER: ICD-10-CM

## 2019-02-12 DIAGNOSIS — D25.9 LEIOMYOMA OF UTERUS AFFECTING PREGNANCY IN SECOND TRIMESTER: ICD-10-CM

## 2019-02-12 DIAGNOSIS — Z36.86 ENCOUNTER FOR ANTENATAL SCREENING FOR CERVICAL LENGTH: Primary | ICD-10-CM

## 2019-02-12 DIAGNOSIS — E03.9 HYPOTHYROID IN PREGNANCY, ANTEPARTUM, SECOND TRIMESTER: ICD-10-CM

## 2019-02-12 DIAGNOSIS — O09.292 HISTORY OF STILLBIRTH IN CURRENTLY PREGNANT PATIENT, SECOND TRIMESTER: ICD-10-CM

## 2019-02-12 DIAGNOSIS — Z3A.20 20 WEEKS GESTATION OF PREGNANCY: ICD-10-CM

## 2019-02-12 DIAGNOSIS — Z36.3 ENCOUNTER FOR ANTENATAL SCREENING FOR MALFORMATIONS: ICD-10-CM

## 2019-02-12 DIAGNOSIS — O34.12 LEIOMYOMA OF UTERUS AFFECTING PREGNANCY IN SECOND TRIMESTER: ICD-10-CM

## 2019-02-12 PROCEDURE — 99212 OFFICE O/P EST SF 10 MIN: CPT | Performed by: OBSTETRICS & GYNECOLOGY

## 2019-02-12 PROCEDURE — 76805 OB US >/= 14 WKS SNGL FETUS: CPT | Performed by: OBSTETRICS & GYNECOLOGY

## 2019-02-12 PROCEDURE — 76817 TRANSVAGINAL US OBSTETRIC: CPT | Performed by: OBSTETRICS & GYNECOLOGY

## 2019-02-12 NOTE — LETTER
February 12, 2019     [address] PROVIDER    Patient: Cain Perez   YOB: 1985   Date of Visit: 2/12/2019       Dear   Provider: Thank you for referring Cain Perez to me for evaluation  Below are my notes for this consultation  If you have questions, please do not hesitate to call me  I look forward to following your patient along with you  Sincerely,        Kike Zarate MD        CC: No Recipients  Kike Zarate MD  2/12/2019 11:43 AM  Sign at close encounter  Please refer to the [facility] ultrasound report in Ob Procedures for additional information regarding the visit to the [facility]  today

## 2019-02-12 NOTE — PROGRESS NOTES
A transvaginal ultrasound was performed  Sonographer note on use of High Level Disinfection Process (Trophon) for transvaginal probe# 1 used, serial [id number]    Alfredo Maya

## 2019-02-12 NOTE — PROGRESS NOTES
Please refer to the [facility] ultrasound report in Ob Procedures for additional information regarding the visit to the [facility]  today

## 2019-03-05 ENCOUNTER — ROUTINE PRENATAL (OUTPATIENT)
Dept: OBGYN CLINIC | Facility: CLINIC | Age: 34
End: 2019-03-05

## 2019-03-05 VITALS
HEART RATE: 87 BPM | HEIGHT: 60 IN | WEIGHT: 153 LBS | DIASTOLIC BLOOD PRESSURE: 64 MMHG | BODY MASS INDEX: 30.04 KG/M2 | SYSTOLIC BLOOD PRESSURE: 113 MMHG

## 2019-03-05 DIAGNOSIS — Z3A.23 23 WEEKS GESTATION OF PREGNANCY: Primary | ICD-10-CM

## 2019-03-05 DIAGNOSIS — O99.282 HYPOTHYROID IN PREGNANCY, ANTEPARTUM, SECOND TRIMESTER: ICD-10-CM

## 2019-03-05 DIAGNOSIS — Z34.92 PRENATAL CARE, SECOND TRIMESTER: ICD-10-CM

## 2019-03-05 DIAGNOSIS — O09.292 HISTORY OF STILLBIRTH IN CURRENTLY PREGNANT PATIENT, SECOND TRIMESTER: ICD-10-CM

## 2019-03-05 DIAGNOSIS — E03.9 HYPOTHYROID IN PREGNANCY, ANTEPARTUM, SECOND TRIMESTER: ICD-10-CM

## 2019-03-05 PROCEDURE — 99213 OFFICE O/P EST LOW 20 MIN: CPT | Performed by: OBSTETRICS & GYNECOLOGY

## 2019-03-05 NOTE — PROGRESS NOTES
Assessment  29 y o  [id number] at 23w0d presenting for routine prenatal visit  Plan  Diagnoses and all orders for this visit:    23 weeks gestation of pregnancy  Prenatal care, second trimester  Routine prenatal care   labor precautions reviewed  Return in 4wks for PN visit    Hypothyroid in pregnancy, antepartum, second trimester  Repeat TSH with 28wk labs    History of stillbirth in currently pregnant patient, second trimester  To begin antepartum fetal surveillance with  Center at 32wks      ____________________________________________________________        Karo Walters is a 29 y o  [id number] at 23w0d who presents for routine prenatal visit  She is without complaint  She denies contractions, loss of fluid, or vaginal bleeding  She feels regular fetal movements  Pregnancy Problems:  Patient Active Problem List   Diagnosis    23 weeks gestation of pregnancy    Hypothyroid in pregnancy, antepartum, second trimester    Leiomyoma of uterus affecting pregnancy in second trimester    Urinary tract infection in mother during first trimester of pregnancy    History of stillbirth in currently pregnant patient, second trimester         Objective  /64 (BP Location: Left arm, Patient Position: Sitting, Cuff Size: Standard)   Pulse 87   Ht 5' (1 524 m)   Wt 69 4 kg (153 lb)   LMP 2018   BMI 29 88 kg/m²     FHT: 155 BPM   Uterine Size: 24 cm     Physical Exam:  Physical Exam   Constitutional: She appears well-developed and well-nourished  No distress  HENT:   Head: Normocephalic and atraumatic  Eyes: No scleral icterus  Pulmonary/Chest: Effort normal  No accessory muscle usage  No respiratory distress  Abdominal: She exhibits distension (gravid)  There is no tenderness  There is no rebound and no guarding  Skin: Skin is warm and dry  No rash noted  No erythema  Psychiatric: She has a normal mood and affect   Her behavior is normal

## 2019-04-02 ENCOUNTER — PATIENT OUTREACH (OUTPATIENT)
Dept: OBGYN CLINIC | Facility: CLINIC | Age: 34
End: 2019-04-02

## 2019-04-02 ENCOUNTER — ROUTINE PRENATAL (OUTPATIENT)
Dept: OBGYN CLINIC | Facility: CLINIC | Age: 34
End: 2019-04-02

## 2019-04-02 VITALS
BODY MASS INDEX: 31.02 KG/M2 | HEART RATE: 102 BPM | DIASTOLIC BLOOD PRESSURE: 61 MMHG | HEIGHT: 60 IN | WEIGHT: 158 LBS | SYSTOLIC BLOOD PRESSURE: 113 MMHG

## 2019-04-02 DIAGNOSIS — O09.292 HISTORY OF STILLBIRTH IN CURRENTLY PREGNANT PATIENT, SECOND TRIMESTER: ICD-10-CM

## 2019-04-02 DIAGNOSIS — Z3A.27 27 WEEKS GESTATION OF PREGNANCY: Primary | ICD-10-CM

## 2019-04-02 DIAGNOSIS — E03.9 HYPOTHYROID IN PREGNANCY, ANTEPARTUM, SECOND TRIMESTER: ICD-10-CM

## 2019-04-02 DIAGNOSIS — O99.282 HYPOTHYROID IN PREGNANCY, ANTEPARTUM, SECOND TRIMESTER: ICD-10-CM

## 2019-04-02 LAB
SL AMB  POCT GLUCOSE, UA: 5
SL AMB POCT URINE PROTEIN: NORMAL

## 2019-04-02 PROCEDURE — 99213 OFFICE O/P EST LOW 20 MIN: CPT | Performed by: OBSTETRICS & GYNECOLOGY

## 2019-04-02 PROCEDURE — 81002 URINALYSIS NONAUTO W/O SCOPE: CPT | Performed by: OBSTETRICS & GYNECOLOGY

## 2019-04-09 ENCOUNTER — APPOINTMENT (OUTPATIENT)
Dept: LAB | Facility: HOSPITAL | Age: 34
End: 2019-04-09

## 2019-04-09 ENCOUNTER — ULTRASOUND (OUTPATIENT)
Dept: PERINATAL CARE | Facility: CLINIC | Age: 34
End: 2019-04-09

## 2019-04-09 VITALS
SYSTOLIC BLOOD PRESSURE: 100 MMHG | BODY MASS INDEX: 31.22 KG/M2 | WEIGHT: 159 LBS | HEART RATE: 80 BPM | DIASTOLIC BLOOD PRESSURE: 68 MMHG | HEIGHT: 60 IN

## 2019-04-09 DIAGNOSIS — O99.282 HYPOTHYROID IN PREGNANCY, ANTEPARTUM, SECOND TRIMESTER: ICD-10-CM

## 2019-04-09 DIAGNOSIS — D25.9 LEIOMYOMA OF UTERUS AFFECTING PREGNANCY IN THIRD TRIMESTER: ICD-10-CM

## 2019-04-09 DIAGNOSIS — Z3A.27 27 WEEKS GESTATION OF PREGNANCY: ICD-10-CM

## 2019-04-09 DIAGNOSIS — O09.293 HISTORY OF STILLBIRTH IN CURRENTLY PREGNANT PATIENT, THIRD TRIMESTER: ICD-10-CM

## 2019-04-09 DIAGNOSIS — E03.9 HYPOTHYROID IN PREGNANCY, ANTEPARTUM, SECOND TRIMESTER: ICD-10-CM

## 2019-04-09 DIAGNOSIS — O34.13 LEIOMYOMA OF UTERUS AFFECTING PREGNANCY IN THIRD TRIMESTER: ICD-10-CM

## 2019-04-09 DIAGNOSIS — Z3A.28 28 WEEKS GESTATION OF PREGNANCY: Primary | ICD-10-CM

## 2019-04-09 LAB
ERYTHROCYTE [DISTWIDTH] IN BLOOD BY AUTOMATED COUNT: 13.2 % (ref 11.6–15.1)
GLUCOSE 1H P 50 G GLC PO SERPL-MCNC: 217 MG/DL
HCT VFR BLD AUTO: 36.5 % (ref 34.8–46.1)
HGB BLD-MCNC: 11.6 G/DL (ref 11.5–15.4)
MCH RBC QN AUTO: 29.4 PG (ref 26.8–34.3)
MCHC RBC AUTO-ENTMCNC: 31.8 G/DL (ref 31.4–37.4)
MCV RBC AUTO: 92 FL (ref 82–98)
PLATELET # BLD AUTO: 215 THOUSANDS/UL (ref 149–390)
PMV BLD AUTO: 9.8 FL (ref 8.9–12.7)
RBC # BLD AUTO: 3.95 MILLION/UL (ref 3.81–5.12)
TSH SERPL DL<=0.05 MIU/L-ACNC: 1.2 UIU/ML (ref 0.36–3.74)
WBC # BLD AUTO: 9.49 THOUSAND/UL (ref 4.31–10.16)

## 2019-04-09 PROCEDURE — 82950 GLUCOSE TEST: CPT

## 2019-04-09 PROCEDURE — 76816 OB US FOLLOW-UP PER FETUS: CPT | Performed by: OBSTETRICS & GYNECOLOGY

## 2019-04-09 PROCEDURE — 85027 COMPLETE CBC AUTOMATED: CPT

## 2019-04-09 PROCEDURE — 86592 SYPHILIS TEST NON-TREP QUAL: CPT

## 2019-04-09 PROCEDURE — 36415 COLL VENOUS BLD VENIPUNCTURE: CPT

## 2019-04-09 PROCEDURE — 99213 OFFICE O/P EST LOW 20 MIN: CPT | Performed by: OBSTETRICS & GYNECOLOGY

## 2019-04-09 PROCEDURE — 84443 ASSAY THYROID STIM HORMONE: CPT

## 2019-04-10 DIAGNOSIS — R73.09 ABNORMAL GTT (GLUCOSE TOLERANCE TEST): Primary | ICD-10-CM

## 2019-04-10 LAB — RPR SER QL: NORMAL

## 2019-04-12 ENCOUNTER — OFFICE VISIT (OUTPATIENT)
Dept: PERINATAL CARE | Facility: CLINIC | Age: 34
End: 2019-04-12

## 2019-04-12 VITALS
HEART RATE: 84 BPM | WEIGHT: 161.4 LBS | BODY MASS INDEX: 31.69 KG/M2 | DIASTOLIC BLOOD PRESSURE: 60 MMHG | SYSTOLIC BLOOD PRESSURE: 110 MMHG | HEIGHT: 60 IN

## 2019-04-12 DIAGNOSIS — R73.09 ABNORMAL GTT (GLUCOSE TOLERANCE TEST): ICD-10-CM

## 2019-04-12 DIAGNOSIS — Z3A.28 28 WEEKS GESTATION OF PREGNANCY: ICD-10-CM

## 2019-04-12 DIAGNOSIS — O34.13 LEIOMYOMA OF UTERUS AFFECTING PREGNANCY IN THIRD TRIMESTER: ICD-10-CM

## 2019-04-12 DIAGNOSIS — O09.293 HISTORY OF STILLBIRTH IN CURRENTLY PREGNANT PATIENT, THIRD TRIMESTER: ICD-10-CM

## 2019-04-12 DIAGNOSIS — O99.282 HYPOTHYROID IN PREGNANCY, ANTEPARTUM, SECOND TRIMESTER: ICD-10-CM

## 2019-04-12 DIAGNOSIS — O26.03 EXCESS WEIGHT GAIN IN PREGNANCY, THIRD TRIMESTER: ICD-10-CM

## 2019-04-12 DIAGNOSIS — D25.9 LEIOMYOMA OF UTERUS AFFECTING PREGNANCY IN THIRD TRIMESTER: ICD-10-CM

## 2019-04-12 DIAGNOSIS — O23.41 URINARY TRACT INFECTION IN MOTHER DURING FIRST TRIMESTER OF PREGNANCY: ICD-10-CM

## 2019-04-12 DIAGNOSIS — E03.9 HYPOTHYROID IN PREGNANCY, ANTEPARTUM, SECOND TRIMESTER: ICD-10-CM

## 2019-04-12 DIAGNOSIS — O24.410 DIET CONTROLLED GESTATIONAL DIABETES MELLITUS (GDM) IN THIRD TRIMESTER: Primary | ICD-10-CM

## 2019-04-12 PROCEDURE — G0108 DIAB MANAGE TRN  PER INDIV: HCPCS

## 2019-04-12 RX ORDER — LANCETS
EACH MISCELLANEOUS
Qty: 102 EACH | Refills: 4 | Status: SHIPPED | OUTPATIENT
Start: 2019-04-12 | End: 2019-07-16

## 2019-04-12 RX ORDER — BLOOD SUGAR DIAGNOSTIC
STRIP MISCELLANEOUS
Qty: 100 EACH | Refills: 4 | Status: SHIPPED | OUTPATIENT
Start: 2019-04-12 | End: 2019-06-18 | Stop reason: HOSPADM

## 2019-04-15 ENCOUNTER — DOCUMENTATION (OUTPATIENT)
Dept: PERINATAL CARE | Facility: CLINIC | Age: 34
End: 2019-04-15

## 2019-04-16 ENCOUNTER — ROUTINE PRENATAL (OUTPATIENT)
Dept: OBGYN CLINIC | Facility: CLINIC | Age: 34
End: 2019-04-16

## 2019-04-16 ENCOUNTER — APPOINTMENT (OUTPATIENT)
Dept: LAB | Facility: HOSPITAL | Age: 34
End: 2019-04-16

## 2019-04-16 ENCOUNTER — OFFICE VISIT (OUTPATIENT)
Dept: PERINATAL CARE | Facility: CLINIC | Age: 34
End: 2019-04-16

## 2019-04-16 VITALS
HEIGHT: 60 IN | DIASTOLIC BLOOD PRESSURE: 57 MMHG | HEART RATE: 77 BPM | BODY MASS INDEX: 31.22 KG/M2 | SYSTOLIC BLOOD PRESSURE: 114 MMHG | WEIGHT: 159 LBS

## 2019-04-16 VITALS
DIASTOLIC BLOOD PRESSURE: 66 MMHG | HEIGHT: 60 IN | HEART RATE: 80 BPM | BODY MASS INDEX: 31.41 KG/M2 | SYSTOLIC BLOOD PRESSURE: 99 MMHG | WEIGHT: 160 LBS

## 2019-04-16 DIAGNOSIS — Z3A.29 29 WEEKS GESTATION OF PREGNANCY: ICD-10-CM

## 2019-04-16 DIAGNOSIS — O09.293 HISTORY OF STILLBIRTH IN CURRENTLY PREGNANT PATIENT, THIRD TRIMESTER: ICD-10-CM

## 2019-04-16 DIAGNOSIS — O23.41 URINARY TRACT INFECTION IN MOTHER DURING FIRST TRIMESTER OF PREGNANCY: ICD-10-CM

## 2019-04-16 DIAGNOSIS — O09.293 HISTORY OF STILLBIRTH IN CURRENTLY PREGNANT PATIENT, THIRD TRIMESTER: Primary | ICD-10-CM

## 2019-04-16 DIAGNOSIS — O24.414 INSULIN CONTROLLED GESTATIONAL DIABETES MELLITUS (GDM) IN THIRD TRIMESTER: Primary | ICD-10-CM

## 2019-04-16 DIAGNOSIS — O34.13 LEIOMYOMA OF UTERUS AFFECTING PREGNANCY IN THIRD TRIMESTER: ICD-10-CM

## 2019-04-16 DIAGNOSIS — D25.9 LEIOMYOMA OF UTERUS AFFECTING PREGNANCY IN THIRD TRIMESTER: ICD-10-CM

## 2019-04-16 DIAGNOSIS — R73.09 ABNORMAL GTT (GLUCOSE TOLERANCE TEST): ICD-10-CM

## 2019-04-16 DIAGNOSIS — O99.283 THYROID DYSFUNCTION IN PREGNANCY, ANTEPARTUM, THIRD TRIMESTER: ICD-10-CM

## 2019-04-16 DIAGNOSIS — E07.9 THYROID DYSFUNCTION IN PREGNANCY, ANTEPARTUM, THIRD TRIMESTER: ICD-10-CM

## 2019-04-16 LAB
ALBUMIN SERPL BCP-MCNC: 3 G/DL (ref 3.5–5)
ALP SERPL-CCNC: 83 U/L (ref 46–116)
ALT SERPL W P-5'-P-CCNC: 15 U/L (ref 12–78)
ANION GAP SERPL CALCULATED.3IONS-SCNC: 6 MMOL/L (ref 4–13)
AST SERPL W P-5'-P-CCNC: 11 U/L (ref 5–45)
BILIRUB SERPL-MCNC: 0.33 MG/DL (ref 0.2–1)
BUN SERPL-MCNC: 8 MG/DL (ref 5–25)
CALCIUM SERPL-MCNC: 8.4 MG/DL (ref 8.3–10.1)
CHLORIDE SERPL-SCNC: 108 MMOL/L (ref 100–108)
CO2 SERPL-SCNC: 21 MMOL/L (ref 21–32)
CREAT SERPL-MCNC: 0.41 MG/DL (ref 0.6–1.3)
EST. AVERAGE GLUCOSE BLD GHB EST-MCNC: 105 MG/DL
GFR SERPL CREATININE-BSD FRML MDRD: 136 ML/MIN/1.73SQ M
GLUCOSE SERPL-MCNC: 76 MG/DL (ref 65–140)
HBA1C MFR BLD: 5.3 % (ref 4.2–6.3)
POTASSIUM SERPL-SCNC: 3.6 MMOL/L (ref 3.5–5.3)
PROT SERPL-MCNC: 6.7 G/DL (ref 6.4–8.2)
SODIUM SERPL-SCNC: 135 MMOL/L (ref 136–145)

## 2019-04-16 PROCEDURE — 80053 COMPREHEN METABOLIC PANEL: CPT | Performed by: DIETITIAN, REGISTERED

## 2019-04-16 PROCEDURE — 36415 COLL VENOUS BLD VENIPUNCTURE: CPT | Performed by: DIETITIAN, REGISTERED

## 2019-04-16 PROCEDURE — 99213 OFFICE O/P EST LOW 20 MIN: CPT | Performed by: NURSE PRACTITIONER

## 2019-04-16 PROCEDURE — 90471 IMMUNIZATION ADMIN: CPT

## 2019-04-16 PROCEDURE — G0108 DIAB MANAGE TRN  PER INDIV: HCPCS | Performed by: DIETITIAN, REGISTERED

## 2019-04-16 PROCEDURE — 83036 HEMOGLOBIN GLYCOSYLATED A1C: CPT | Performed by: DIETITIAN, REGISTERED

## 2019-04-16 PROCEDURE — 90715 TDAP VACCINE 7 YRS/> IM: CPT

## 2019-04-16 RX ORDER — NAPROXEN SODIUM 220 MG
TABLET ORAL
Qty: 100 EACH | Refills: 3 | Status: SHIPPED | OUTPATIENT
Start: 2019-04-16 | End: 2019-07-16

## 2019-04-24 ENCOUNTER — DOCUMENTATION (OUTPATIENT)
Dept: PERINATAL CARE | Facility: CLINIC | Age: 34
End: 2019-04-24

## 2019-04-26 ENCOUNTER — DOCUMENTATION (OUTPATIENT)
Dept: PERINATAL CARE | Facility: CLINIC | Age: 34
End: 2019-04-26

## 2019-04-26 ENCOUNTER — OFFICE VISIT (OUTPATIENT)
Dept: PERINATAL CARE | Facility: CLINIC | Age: 34
End: 2019-04-26

## 2019-04-26 VITALS
HEIGHT: 60 IN | HEART RATE: 80 BPM | DIASTOLIC BLOOD PRESSURE: 72 MMHG | SYSTOLIC BLOOD PRESSURE: 106 MMHG | BODY MASS INDEX: 31.25 KG/M2 | WEIGHT: 159.2 LBS

## 2019-04-26 DIAGNOSIS — E07.9 THYROID DYSFUNCTION IN PREGNANCY, ANTEPARTUM, THIRD TRIMESTER: ICD-10-CM

## 2019-04-26 DIAGNOSIS — O23.41 URINARY TRACT INFECTION IN MOTHER DURING FIRST TRIMESTER OF PREGNANCY: ICD-10-CM

## 2019-04-26 DIAGNOSIS — O34.13 LEIOMYOMA OF UTERUS AFFECTING PREGNANCY IN THIRD TRIMESTER: ICD-10-CM

## 2019-04-26 DIAGNOSIS — O99.283 THYROID DYSFUNCTION IN PREGNANCY, ANTEPARTUM, THIRD TRIMESTER: ICD-10-CM

## 2019-04-26 DIAGNOSIS — O24.414 INSULIN CONTROLLED GESTATIONAL DIABETES MELLITUS (GDM) IN THIRD TRIMESTER: Primary | ICD-10-CM

## 2019-04-26 DIAGNOSIS — O09.293 HISTORY OF STILLBIRTH IN CURRENTLY PREGNANT PATIENT, THIRD TRIMESTER: ICD-10-CM

## 2019-04-26 DIAGNOSIS — D25.9 LEIOMYOMA OF UTERUS AFFECTING PREGNANCY IN THIRD TRIMESTER: ICD-10-CM

## 2019-04-26 DIAGNOSIS — Z3A.30 30 WEEKS GESTATION OF PREGNANCY: ICD-10-CM

## 2019-04-26 PROCEDURE — G0108 DIAB MANAGE TRN  PER INDIV: HCPCS

## 2019-04-29 ENCOUNTER — DOCUMENTATION (OUTPATIENT)
Dept: PERINATAL CARE | Facility: CLINIC | Age: 34
End: 2019-04-29

## 2019-04-30 ENCOUNTER — ROUTINE PRENATAL (OUTPATIENT)
Dept: OBGYN CLINIC | Facility: CLINIC | Age: 34
End: 2019-04-30

## 2019-04-30 VITALS
BODY MASS INDEX: 31.41 KG/M2 | HEIGHT: 60 IN | HEART RATE: 79 BPM | DIASTOLIC BLOOD PRESSURE: 68 MMHG | WEIGHT: 160 LBS | SYSTOLIC BLOOD PRESSURE: 111 MMHG

## 2019-04-30 DIAGNOSIS — Z3A.31 31 WEEKS GESTATION OF PREGNANCY: Primary | ICD-10-CM

## 2019-04-30 DIAGNOSIS — E07.9 THYROID DYSFUNCTION IN PREGNANCY, ANTEPARTUM, THIRD TRIMESTER: ICD-10-CM

## 2019-04-30 DIAGNOSIS — R73.09 ABNORMAL GTT (GLUCOSE TOLERANCE TEST): ICD-10-CM

## 2019-04-30 DIAGNOSIS — O99.283 THYROID DYSFUNCTION IN PREGNANCY, ANTEPARTUM, THIRD TRIMESTER: ICD-10-CM

## 2019-04-30 DIAGNOSIS — O09.293 HISTORY OF STILLBIRTH IN CURRENTLY PREGNANT PATIENT, THIRD TRIMESTER: ICD-10-CM

## 2019-04-30 LAB
SL AMB  POCT GLUCOSE, UA: 1
SL AMB POCT URINE PROTEIN: NORMAL

## 2019-04-30 PROCEDURE — 99213 OFFICE O/P EST LOW 20 MIN: CPT | Performed by: NURSE PRACTITIONER

## 2019-04-30 PROCEDURE — 81002 URINALYSIS NONAUTO W/O SCOPE: CPT | Performed by: NURSE PRACTITIONER

## 2019-05-02 ENCOUNTER — DOCUMENTATION (OUTPATIENT)
Dept: PERINATAL CARE | Facility: CLINIC | Age: 34
End: 2019-05-02

## 2019-05-07 ENCOUNTER — DOCUMENTATION (OUTPATIENT)
Dept: PERINATAL CARE | Facility: CLINIC | Age: 34
End: 2019-05-07

## 2019-05-07 ENCOUNTER — ULTRASOUND (OUTPATIENT)
Dept: PERINATAL CARE | Facility: CLINIC | Age: 34
End: 2019-05-07

## 2019-05-07 VITALS
WEIGHT: 161.6 LBS | HEART RATE: 80 BPM | SYSTOLIC BLOOD PRESSURE: 92 MMHG | BODY MASS INDEX: 31.56 KG/M2 | DIASTOLIC BLOOD PRESSURE: 62 MMHG

## 2019-05-07 DIAGNOSIS — Z3A.32 32 WEEKS GESTATION OF PREGNANCY: ICD-10-CM

## 2019-05-07 DIAGNOSIS — O09.293 HISTORY OF STILLBIRTH IN CURRENTLY PREGNANT PATIENT, THIRD TRIMESTER: ICD-10-CM

## 2019-05-07 DIAGNOSIS — O24.419 GDM, CLASS A2: Primary | ICD-10-CM

## 2019-05-07 PROCEDURE — 76815 OB US LIMITED FETUS(S): CPT | Performed by: OBSTETRICS & GYNECOLOGY

## 2019-05-07 PROCEDURE — 59025 FETAL NON-STRESS TEST: CPT | Performed by: OBSTETRICS & GYNECOLOGY

## 2019-05-10 ENCOUNTER — ROUTINE PRENATAL (OUTPATIENT)
Dept: PERINATAL CARE | Facility: CLINIC | Age: 34
End: 2019-05-10

## 2019-05-10 VITALS
HEIGHT: 60 IN | BODY MASS INDEX: 31.61 KG/M2 | SYSTOLIC BLOOD PRESSURE: 98 MMHG | WEIGHT: 161 LBS | DIASTOLIC BLOOD PRESSURE: 60 MMHG

## 2019-05-10 DIAGNOSIS — O99.283 THYROID DYSFUNCTION IN PREGNANCY, ANTEPARTUM, THIRD TRIMESTER: ICD-10-CM

## 2019-05-10 DIAGNOSIS — O09.293 HISTORY OF STILLBIRTH IN CURRENTLY PREGNANT PATIENT, THIRD TRIMESTER: ICD-10-CM

## 2019-05-10 DIAGNOSIS — D25.9 LEIOMYOMA OF UTERUS AFFECTING PREGNANCY IN THIRD TRIMESTER: ICD-10-CM

## 2019-05-10 DIAGNOSIS — O24.419 GDM, CLASS A2: Primary | ICD-10-CM

## 2019-05-10 DIAGNOSIS — E07.9 THYROID DYSFUNCTION IN PREGNANCY, ANTEPARTUM, THIRD TRIMESTER: ICD-10-CM

## 2019-05-10 DIAGNOSIS — O34.13 LEIOMYOMA OF UTERUS AFFECTING PREGNANCY IN THIRD TRIMESTER: ICD-10-CM

## 2019-05-10 DIAGNOSIS — O23.41 URINARY TRACT INFECTION IN MOTHER DURING FIRST TRIMESTER OF PREGNANCY: ICD-10-CM

## 2019-05-10 DIAGNOSIS — Z3A.33 33 WEEKS GESTATION OF PREGNANCY: ICD-10-CM

## 2019-05-10 PROCEDURE — 59025 FETAL NON-STRESS TEST: CPT | Performed by: OBSTETRICS & GYNECOLOGY

## 2019-05-14 ENCOUNTER — ROUTINE PRENATAL (OUTPATIENT)
Dept: OBGYN CLINIC | Facility: CLINIC | Age: 34
End: 2019-05-14

## 2019-05-14 ENCOUNTER — ULTRASOUND (OUTPATIENT)
Dept: PERINATAL CARE | Facility: CLINIC | Age: 34
End: 2019-05-14

## 2019-05-14 VITALS
DIASTOLIC BLOOD PRESSURE: 66 MMHG | HEIGHT: 60 IN | BODY MASS INDEX: 31.88 KG/M2 | HEART RATE: 85 BPM | WEIGHT: 162.4 LBS | SYSTOLIC BLOOD PRESSURE: 96 MMHG

## 2019-05-14 VITALS
WEIGHT: 161 LBS | HEIGHT: 60 IN | HEART RATE: 92 BPM | SYSTOLIC BLOOD PRESSURE: 107 MMHG | BODY MASS INDEX: 31.61 KG/M2 | DIASTOLIC BLOOD PRESSURE: 69 MMHG

## 2019-05-14 DIAGNOSIS — E07.9 THYROID DYSFUNCTION IN PREGNANCY, ANTEPARTUM, THIRD TRIMESTER: Primary | ICD-10-CM

## 2019-05-14 DIAGNOSIS — Z3A.33 33 WEEKS GESTATION OF PREGNANCY: ICD-10-CM

## 2019-05-14 DIAGNOSIS — O99.283 THYROID DYSFUNCTION IN PREGNANCY, ANTEPARTUM, THIRD TRIMESTER: ICD-10-CM

## 2019-05-14 DIAGNOSIS — D25.9 LEIOMYOMA OF UTERUS AFFECTING PREGNANCY IN THIRD TRIMESTER: ICD-10-CM

## 2019-05-14 DIAGNOSIS — R73.09 ABNORMAL GTT (GLUCOSE TOLERANCE TEST): ICD-10-CM

## 2019-05-14 DIAGNOSIS — O34.13 LEIOMYOMA OF UTERUS AFFECTING PREGNANCY IN THIRD TRIMESTER: ICD-10-CM

## 2019-05-14 DIAGNOSIS — O23.41 URINARY TRACT INFECTION IN MOTHER DURING FIRST TRIMESTER OF PREGNANCY: ICD-10-CM

## 2019-05-14 DIAGNOSIS — O09.293 HISTORY OF STILLBIRTH IN CURRENTLY PREGNANT PATIENT, THIRD TRIMESTER: ICD-10-CM

## 2019-05-14 DIAGNOSIS — O09.293 HISTORY OF STILLBIRTH IN CURRENTLY PREGNANT PATIENT, THIRD TRIMESTER: Primary | ICD-10-CM

## 2019-05-14 DIAGNOSIS — O99.283 THYROID DYSFUNCTION IN PREGNANCY, ANTEPARTUM, THIRD TRIMESTER: Primary | ICD-10-CM

## 2019-05-14 DIAGNOSIS — E07.9 THYROID DYSFUNCTION IN PREGNANCY, ANTEPARTUM, THIRD TRIMESTER: ICD-10-CM

## 2019-05-14 LAB
SL AMB  POCT GLUCOSE, UA: 4
SL AMB POCT URINE PROTEIN: NORMAL

## 2019-05-14 PROCEDURE — 76815 OB US LIMITED FETUS(S): CPT | Performed by: OBSTETRICS & GYNECOLOGY

## 2019-05-14 PROCEDURE — 81002 URINALYSIS NONAUTO W/O SCOPE: CPT | Performed by: NURSE PRACTITIONER

## 2019-05-14 PROCEDURE — 59025 FETAL NON-STRESS TEST: CPT | Performed by: OBSTETRICS & GYNECOLOGY

## 2019-05-14 PROCEDURE — 99213 OFFICE O/P EST LOW 20 MIN: CPT | Performed by: NURSE PRACTITIONER

## 2019-05-15 ENCOUNTER — DOCUMENTATION (OUTPATIENT)
Dept: PERINATAL CARE | Facility: CLINIC | Age: 34
End: 2019-05-15

## 2019-05-15 DIAGNOSIS — O24.414 INSULIN CONTROLLED GESTATIONAL DIABETES MELLITUS (GDM) IN THIRD TRIMESTER: ICD-10-CM

## 2019-05-17 ENCOUNTER — ROUTINE PRENATAL (OUTPATIENT)
Dept: PERINATAL CARE | Facility: CLINIC | Age: 34
End: 2019-05-17

## 2019-05-17 VITALS
SYSTOLIC BLOOD PRESSURE: 109 MMHG | BODY MASS INDEX: 32.04 KG/M2 | DIASTOLIC BLOOD PRESSURE: 68 MMHG | HEART RATE: 78 BPM | HEIGHT: 60 IN | WEIGHT: 163.2 LBS

## 2019-05-17 DIAGNOSIS — O23.41 URINARY TRACT INFECTION IN MOTHER DURING FIRST TRIMESTER OF PREGNANCY: ICD-10-CM

## 2019-05-17 DIAGNOSIS — Z3A.33 33 WEEKS GESTATION OF PREGNANCY: ICD-10-CM

## 2019-05-17 DIAGNOSIS — O09.293 HISTORY OF STILLBIRTH IN CURRENTLY PREGNANT PATIENT, THIRD TRIMESTER: ICD-10-CM

## 2019-05-17 DIAGNOSIS — O99.283 THYROID DYSFUNCTION IN PREGNANCY, ANTEPARTUM, THIRD TRIMESTER: ICD-10-CM

## 2019-05-17 DIAGNOSIS — E07.9 THYROID DYSFUNCTION IN PREGNANCY, ANTEPARTUM, THIRD TRIMESTER: ICD-10-CM

## 2019-05-17 DIAGNOSIS — O24.419 GDM, CLASS A2: ICD-10-CM

## 2019-05-17 DIAGNOSIS — D25.9 LEIOMYOMA OF UTERUS AFFECTING PREGNANCY IN THIRD TRIMESTER: ICD-10-CM

## 2019-05-17 DIAGNOSIS — O34.13 LEIOMYOMA OF UTERUS AFFECTING PREGNANCY IN THIRD TRIMESTER: ICD-10-CM

## 2019-05-17 PROCEDURE — 59025 FETAL NON-STRESS TEST: CPT | Performed by: OBSTETRICS & GYNECOLOGY

## 2019-05-20 ENCOUNTER — DOCUMENTATION (OUTPATIENT)
Dept: PERINATAL CARE | Facility: CLINIC | Age: 34
End: 2019-05-20

## 2019-05-21 ENCOUNTER — ULTRASOUND (OUTPATIENT)
Dept: PERINATAL CARE | Facility: CLINIC | Age: 34
End: 2019-05-21

## 2019-05-21 ENCOUNTER — DOCUMENTATION (OUTPATIENT)
Dept: PERINATAL CARE | Facility: CLINIC | Age: 34
End: 2019-05-21

## 2019-05-21 VITALS
HEART RATE: 74 BPM | WEIGHT: 161.8 LBS | SYSTOLIC BLOOD PRESSURE: 108 MMHG | BODY MASS INDEX: 31.77 KG/M2 | HEIGHT: 60 IN | DIASTOLIC BLOOD PRESSURE: 74 MMHG

## 2019-05-21 DIAGNOSIS — O09.293 HISTORY OF STILLBIRTH IN CURRENTLY PREGNANT PATIENT, THIRD TRIMESTER: ICD-10-CM

## 2019-05-21 DIAGNOSIS — E07.9 THYROID DYSFUNCTION IN PREGNANCY, ANTEPARTUM, THIRD TRIMESTER: ICD-10-CM

## 2019-05-21 DIAGNOSIS — O24.419 GDM, CLASS A2: Primary | ICD-10-CM

## 2019-05-21 DIAGNOSIS — D25.9 LEIOMYOMA OF UTERUS AFFECTING PREGNANCY IN THIRD TRIMESTER: ICD-10-CM

## 2019-05-21 DIAGNOSIS — O34.13 LEIOMYOMA OF UTERUS AFFECTING PREGNANCY IN THIRD TRIMESTER: ICD-10-CM

## 2019-05-21 DIAGNOSIS — Z3A.34 34 WEEKS GESTATION OF PREGNANCY: ICD-10-CM

## 2019-05-21 DIAGNOSIS — O23.41 URINARY TRACT INFECTION IN MOTHER DURING FIRST TRIMESTER OF PREGNANCY: ICD-10-CM

## 2019-05-21 DIAGNOSIS — O99.283 THYROID DYSFUNCTION IN PREGNANCY, ANTEPARTUM, THIRD TRIMESTER: ICD-10-CM

## 2019-05-21 PROBLEM — R73.09 ABNORMAL GTT (GLUCOSE TOLERANCE TEST): Status: RESOLVED | Noted: 2019-04-10 | Resolved: 2019-05-21

## 2019-05-21 PROCEDURE — 59025 FETAL NON-STRESS TEST: CPT | Performed by: OBSTETRICS & GYNECOLOGY

## 2019-05-21 PROCEDURE — 76816 OB US FOLLOW-UP PER FETUS: CPT | Performed by: OBSTETRICS & GYNECOLOGY

## 2019-05-21 PROCEDURE — 99212 OFFICE O/P EST SF 10 MIN: CPT | Performed by: OBSTETRICS & GYNECOLOGY

## 2019-05-24 ENCOUNTER — ROUTINE PRENATAL (OUTPATIENT)
Dept: PERINATAL CARE | Facility: CLINIC | Age: 34
End: 2019-05-24

## 2019-05-24 ENCOUNTER — DOCUMENTATION (OUTPATIENT)
Dept: PERINATAL CARE | Facility: CLINIC | Age: 34
End: 2019-05-24

## 2019-05-24 VITALS
BODY MASS INDEX: 31.84 KG/M2 | WEIGHT: 162.2 LBS | HEART RATE: 86 BPM | DIASTOLIC BLOOD PRESSURE: 68 MMHG | HEIGHT: 60 IN | SYSTOLIC BLOOD PRESSURE: 101 MMHG

## 2019-05-24 DIAGNOSIS — E07.9 THYROID DYSFUNCTION IN PREGNANCY, ANTEPARTUM, THIRD TRIMESTER: ICD-10-CM

## 2019-05-24 DIAGNOSIS — O09.293 HISTORY OF STILLBIRTH IN CURRENTLY PREGNANT PATIENT, THIRD TRIMESTER: Primary | ICD-10-CM

## 2019-05-24 DIAGNOSIS — O23.41 URINARY TRACT INFECTION IN MOTHER DURING FIRST TRIMESTER OF PREGNANCY: ICD-10-CM

## 2019-05-24 DIAGNOSIS — Z3A.34 34 WEEKS GESTATION OF PREGNANCY: ICD-10-CM

## 2019-05-24 DIAGNOSIS — O24.414 INSULIN CONTROLLED GESTATIONAL DIABETES MELLITUS (GDM) IN THIRD TRIMESTER: ICD-10-CM

## 2019-05-24 DIAGNOSIS — D25.9 LEIOMYOMA OF UTERUS AFFECTING PREGNANCY IN THIRD TRIMESTER: ICD-10-CM

## 2019-05-24 DIAGNOSIS — O09.293 HISTORY OF STILLBIRTH IN CURRENTLY PREGNANT PATIENT, THIRD TRIMESTER: ICD-10-CM

## 2019-05-24 DIAGNOSIS — O99.283 THYROID DYSFUNCTION IN PREGNANCY, ANTEPARTUM, THIRD TRIMESTER: ICD-10-CM

## 2019-05-24 DIAGNOSIS — O34.13 LEIOMYOMA OF UTERUS AFFECTING PREGNANCY IN THIRD TRIMESTER: ICD-10-CM

## 2019-05-24 DIAGNOSIS — O24.419 GDM, CLASS A2: ICD-10-CM

## 2019-05-24 PROCEDURE — 76818 FETAL BIOPHYS PROFILE W/NST: CPT | Performed by: OBSTETRICS & GYNECOLOGY

## 2019-05-28 ENCOUNTER — ROUTINE PRENATAL (OUTPATIENT)
Dept: OBGYN CLINIC | Facility: CLINIC | Age: 34
End: 2019-05-28

## 2019-05-28 ENCOUNTER — ROUTINE PRENATAL (OUTPATIENT)
Dept: PERINATAL CARE | Facility: CLINIC | Age: 34
End: 2019-05-28

## 2019-05-28 ENCOUNTER — DOCUMENTATION (OUTPATIENT)
Dept: PERINATAL CARE | Facility: CLINIC | Age: 34
End: 2019-05-28

## 2019-05-28 VITALS
SYSTOLIC BLOOD PRESSURE: 101 MMHG | HEART RATE: 78 BPM | WEIGHT: 164.6 LBS | HEIGHT: 60 IN | DIASTOLIC BLOOD PRESSURE: 67 MMHG | BODY MASS INDEX: 32.31 KG/M2

## 2019-05-28 VITALS
WEIGHT: 162 LBS | SYSTOLIC BLOOD PRESSURE: 110 MMHG | HEIGHT: 60 IN | HEART RATE: 79 BPM | BODY MASS INDEX: 31.8 KG/M2 | DIASTOLIC BLOOD PRESSURE: 70 MMHG

## 2019-05-28 DIAGNOSIS — Z3A.35 35 WEEKS GESTATION OF PREGNANCY: ICD-10-CM

## 2019-05-28 DIAGNOSIS — O09.293 HISTORY OF STILLBIRTH IN CURRENTLY PREGNANT PATIENT, THIRD TRIMESTER: ICD-10-CM

## 2019-05-28 DIAGNOSIS — O09.293 HISTORY OF STILLBIRTH IN CURRENTLY PREGNANT PATIENT, THIRD TRIMESTER: Primary | ICD-10-CM

## 2019-05-28 DIAGNOSIS — O99.283 THYROID DYSFUNCTION IN PREGNANCY, ANTEPARTUM, THIRD TRIMESTER: ICD-10-CM

## 2019-05-28 DIAGNOSIS — O24.414 INSULIN CONTROLLED GESTATIONAL DIABETES MELLITUS (GDM) IN THIRD TRIMESTER: Primary | ICD-10-CM

## 2019-05-28 DIAGNOSIS — E07.9 THYROID DYSFUNCTION IN PREGNANCY, ANTEPARTUM, THIRD TRIMESTER: ICD-10-CM

## 2019-05-28 DIAGNOSIS — O23.41 URINARY TRACT INFECTION IN MOTHER DURING FIRST TRIMESTER OF PREGNANCY: ICD-10-CM

## 2019-05-28 DIAGNOSIS — O34.13 LEIOMYOMA OF UTERUS AFFECTING PREGNANCY IN THIRD TRIMESTER: ICD-10-CM

## 2019-05-28 DIAGNOSIS — D25.9 LEIOMYOMA OF UTERUS AFFECTING PREGNANCY IN THIRD TRIMESTER: ICD-10-CM

## 2019-05-28 PROCEDURE — 76815 OB US LIMITED FETUS(S): CPT | Performed by: OBSTETRICS & GYNECOLOGY

## 2019-05-28 PROCEDURE — 59025 FETAL NON-STRESS TEST: CPT | Performed by: OBSTETRICS & GYNECOLOGY

## 2019-05-28 PROCEDURE — 99213 OFFICE O/P EST LOW 20 MIN: CPT | Performed by: NURSE PRACTITIONER

## 2019-05-31 ENCOUNTER — ROUTINE PRENATAL (OUTPATIENT)
Dept: PERINATAL CARE | Facility: CLINIC | Age: 34
End: 2019-05-31

## 2019-05-31 ENCOUNTER — DOCUMENTATION (OUTPATIENT)
Dept: PERINATAL CARE | Facility: CLINIC | Age: 34
End: 2019-05-31

## 2019-05-31 VITALS
SYSTOLIC BLOOD PRESSURE: 103 MMHG | BODY MASS INDEX: 32.47 KG/M2 | DIASTOLIC BLOOD PRESSURE: 69 MMHG | HEIGHT: 60 IN | HEART RATE: 84 BPM | WEIGHT: 165.4 LBS

## 2019-05-31 DIAGNOSIS — Z3A.35 35 WEEKS GESTATION OF PREGNANCY: ICD-10-CM

## 2019-05-31 DIAGNOSIS — D25.9 LEIOMYOMA OF UTERUS AFFECTING PREGNANCY IN THIRD TRIMESTER: ICD-10-CM

## 2019-05-31 DIAGNOSIS — O09.293 HISTORY OF STILLBIRTH IN CURRENTLY PREGNANT PATIENT, THIRD TRIMESTER: ICD-10-CM

## 2019-05-31 DIAGNOSIS — O23.41 URINARY TRACT INFECTION IN MOTHER DURING FIRST TRIMESTER OF PREGNANCY: ICD-10-CM

## 2019-05-31 DIAGNOSIS — O24.419 GDM, CLASS A2: Primary | ICD-10-CM

## 2019-05-31 DIAGNOSIS — O99.283 THYROID DYSFUNCTION IN PREGNANCY, ANTEPARTUM, THIRD TRIMESTER: ICD-10-CM

## 2019-05-31 DIAGNOSIS — E07.9 THYROID DYSFUNCTION IN PREGNANCY, ANTEPARTUM, THIRD TRIMESTER: ICD-10-CM

## 2019-05-31 DIAGNOSIS — O34.13 LEIOMYOMA OF UTERUS AFFECTING PREGNANCY IN THIRD TRIMESTER: ICD-10-CM

## 2019-05-31 PROCEDURE — 59025 FETAL NON-STRESS TEST: CPT | Performed by: OBSTETRICS & GYNECOLOGY

## 2019-05-31 PROCEDURE — 76815 OB US LIMITED FETUS(S): CPT | Performed by: OBSTETRICS & GYNECOLOGY

## 2019-06-01 ENCOUNTER — PRE-BIRTH ASSESSMENT (OUTPATIENT)
Dept: LABOR AND DELIVERY | Facility: HOSPITAL | Age: 34
End: 2019-06-01

## 2019-06-03 PROBLEM — O24.414 INSULIN CONTROLLED GESTATIONAL DIABETES MELLITUS (GDM) IN THIRD TRIMESTER: Status: ACTIVE | Noted: 2019-06-03

## 2019-06-03 PROBLEM — Z3A.36 36 WEEKS GESTATION OF PREGNANCY: Status: ACTIVE | Noted: 2018-11-06

## 2019-06-04 ENCOUNTER — DOCUMENTATION (OUTPATIENT)
Dept: PERINATAL CARE | Facility: CLINIC | Age: 34
End: 2019-06-04

## 2019-06-04 ENCOUNTER — ULTRASOUND (OUTPATIENT)
Dept: PERINATAL CARE | Facility: CLINIC | Age: 34
End: 2019-06-04

## 2019-06-04 ENCOUNTER — ROUTINE PRENATAL (OUTPATIENT)
Dept: OBGYN CLINIC | Facility: CLINIC | Age: 34
End: 2019-06-04

## 2019-06-04 VITALS
DIASTOLIC BLOOD PRESSURE: 56 MMHG | BODY MASS INDEX: 32.39 KG/M2 | WEIGHT: 165 LBS | HEIGHT: 60 IN | HEART RATE: 73 BPM | SYSTOLIC BLOOD PRESSURE: 108 MMHG

## 2019-06-04 VITALS
DIASTOLIC BLOOD PRESSURE: 66 MMHG | SYSTOLIC BLOOD PRESSURE: 101 MMHG | HEART RATE: 88 BPM | BODY MASS INDEX: 32.63 KG/M2 | HEIGHT: 60 IN | WEIGHT: 166.2 LBS

## 2019-06-04 DIAGNOSIS — O99.283 THYROID DYSFUNCTION IN PREGNANCY, ANTEPARTUM, THIRD TRIMESTER: ICD-10-CM

## 2019-06-04 DIAGNOSIS — Z3A.36 36 WEEKS GESTATION OF PREGNANCY: ICD-10-CM

## 2019-06-04 DIAGNOSIS — O24.414 INSULIN CONTROLLED GESTATIONAL DIABETES MELLITUS (GDM) IN THIRD TRIMESTER: Primary | ICD-10-CM

## 2019-06-04 DIAGNOSIS — O09.293 HISTORY OF STILLBIRTH IN CURRENTLY PREGNANT PATIENT, THIRD TRIMESTER: ICD-10-CM

## 2019-06-04 DIAGNOSIS — E07.9 THYROID DYSFUNCTION IN PREGNANCY, ANTEPARTUM, THIRD TRIMESTER: ICD-10-CM

## 2019-06-04 DIAGNOSIS — D25.9 LEIOMYOMA OF UTERUS AFFECTING PREGNANCY IN THIRD TRIMESTER: ICD-10-CM

## 2019-06-04 DIAGNOSIS — O34.13 LEIOMYOMA OF UTERUS AFFECTING PREGNANCY IN THIRD TRIMESTER: ICD-10-CM

## 2019-06-04 DIAGNOSIS — O40.3XX0 POLYHYDRAMNIOS IN THIRD TRIMESTER COMPLICATION, SINGLE OR UNSPECIFIED FETUS: ICD-10-CM

## 2019-06-04 LAB
SL AMB  POCT GLUCOSE, UA: NORMAL
SL AMB POCT URINE PROTEIN: NORMAL

## 2019-06-04 PROCEDURE — 99213 OFFICE O/P EST LOW 20 MIN: CPT | Performed by: NURSE PRACTITIONER

## 2019-06-04 PROCEDURE — 81002 URINALYSIS NONAUTO W/O SCOPE: CPT | Performed by: NURSE PRACTITIONER

## 2019-06-04 PROCEDURE — 76818 FETAL BIOPHYS PROFILE W/NST: CPT | Performed by: OBSTETRICS & GYNECOLOGY

## 2019-06-04 PROCEDURE — 87653 STREP B DNA AMP PROBE: CPT | Performed by: NURSE PRACTITIONER

## 2019-06-06 LAB — GP B STREP DNA SPEC QL NAA+PROBE: NORMAL

## 2019-06-07 ENCOUNTER — ROUTINE PRENATAL (OUTPATIENT)
Dept: PERINATAL CARE | Facility: CLINIC | Age: 34
End: 2019-06-07

## 2019-06-07 VITALS
DIASTOLIC BLOOD PRESSURE: 70 MMHG | SYSTOLIC BLOOD PRESSURE: 103 MMHG | HEART RATE: 80 BPM | HEIGHT: 60 IN | WEIGHT: 167 LBS | BODY MASS INDEX: 32.79 KG/M2

## 2019-06-07 DIAGNOSIS — O36.8390 FETAL ARRHYTHMIA AFFECTING PREGNANCY, ANTEPARTUM: ICD-10-CM

## 2019-06-07 DIAGNOSIS — Z3A.36 36 WEEKS GESTATION OF PREGNANCY: ICD-10-CM

## 2019-06-07 DIAGNOSIS — O09.293 HISTORY OF STILLBIRTH IN CURRENTLY PREGNANT PATIENT, THIRD TRIMESTER: ICD-10-CM

## 2019-06-07 DIAGNOSIS — O24.414 INSULIN CONTROLLED GESTATIONAL DIABETES MELLITUS (GDM) IN THIRD TRIMESTER: Primary | ICD-10-CM

## 2019-06-07 PROCEDURE — 76828 ECHO EXAM OF FETAL HEART: CPT | Performed by: OBSTETRICS & GYNECOLOGY

## 2019-06-07 PROCEDURE — 76815 OB US LIMITED FETUS(S): CPT | Performed by: OBSTETRICS & GYNECOLOGY

## 2019-06-07 PROCEDURE — 59025 FETAL NON-STRESS TEST: CPT | Performed by: OBSTETRICS & GYNECOLOGY

## 2019-06-11 ENCOUNTER — DOCUMENTATION (OUTPATIENT)
Dept: PERINATAL CARE | Facility: CLINIC | Age: 34
End: 2019-06-11

## 2019-06-11 ENCOUNTER — ULTRASOUND (OUTPATIENT)
Dept: PERINATAL CARE | Facility: CLINIC | Age: 34
End: 2019-06-11

## 2019-06-11 ENCOUNTER — APPOINTMENT (OUTPATIENT)
Dept: LAB | Facility: HOSPITAL | Age: 34
End: 2019-06-11

## 2019-06-11 ENCOUNTER — ROUTINE PRENATAL (OUTPATIENT)
Dept: OBGYN CLINIC | Facility: CLINIC | Age: 34
End: 2019-06-11

## 2019-06-11 VITALS
DIASTOLIC BLOOD PRESSURE: 75 MMHG | SYSTOLIC BLOOD PRESSURE: 111 MMHG | WEIGHT: 166 LBS | HEIGHT: 60 IN | BODY MASS INDEX: 32.59 KG/M2 | HEART RATE: 79 BPM

## 2019-06-11 VITALS
HEART RATE: 77 BPM | WEIGHT: 166.8 LBS | BODY MASS INDEX: 32.75 KG/M2 | HEIGHT: 60 IN | DIASTOLIC BLOOD PRESSURE: 71 MMHG | SYSTOLIC BLOOD PRESSURE: 106 MMHG

## 2019-06-11 DIAGNOSIS — E07.9 THYROID DYSFUNCTION IN PREGNANCY, ANTEPARTUM, THIRD TRIMESTER: ICD-10-CM

## 2019-06-11 DIAGNOSIS — O24.414 INSULIN CONTROLLED GESTATIONAL DIABETES MELLITUS (GDM) IN THIRD TRIMESTER: ICD-10-CM

## 2019-06-11 DIAGNOSIS — O34.13 LEIOMYOMA OF UTERUS AFFECTING PREGNANCY IN THIRD TRIMESTER: ICD-10-CM

## 2019-06-11 DIAGNOSIS — O36.8390 FETAL ARRHYTHMIA AFFECTING PREGNANCY, ANTEPARTUM: ICD-10-CM

## 2019-06-11 DIAGNOSIS — Z3A.37 37 WEEKS GESTATION OF PREGNANCY: ICD-10-CM

## 2019-06-11 DIAGNOSIS — O40.3XX0 POLYHYDRAMNIOS IN THIRD TRIMESTER COMPLICATION, SINGLE OR UNSPECIFIED FETUS: ICD-10-CM

## 2019-06-11 DIAGNOSIS — O99.283 THYROID DYSFUNCTION IN PREGNANCY, ANTEPARTUM, THIRD TRIMESTER: ICD-10-CM

## 2019-06-11 DIAGNOSIS — O24.414 INSULIN CONTROLLED GESTATIONAL DIABETES MELLITUS (GDM) IN THIRD TRIMESTER: Primary | ICD-10-CM

## 2019-06-11 DIAGNOSIS — D25.9 LEIOMYOMA OF UTERUS AFFECTING PREGNANCY IN THIRD TRIMESTER: ICD-10-CM

## 2019-06-11 DIAGNOSIS — O09.293 HISTORY OF STILLBIRTH IN CURRENTLY PREGNANT PATIENT, THIRD TRIMESTER: Primary | ICD-10-CM

## 2019-06-11 DIAGNOSIS — O09.293 HISTORY OF STILLBIRTH IN CURRENTLY PREGNANT PATIENT, THIRD TRIMESTER: ICD-10-CM

## 2019-06-11 LAB
EST. AVERAGE GLUCOSE BLD GHB EST-MCNC: 105 MG/DL
HBA1C MFR BLD: 5.3 % (ref 4.2–6.3)

## 2019-06-11 PROCEDURE — 83036 HEMOGLOBIN GLYCOSYLATED A1C: CPT

## 2019-06-11 PROCEDURE — 59025 FETAL NON-STRESS TEST: CPT | Performed by: OBSTETRICS & GYNECOLOGY

## 2019-06-11 PROCEDURE — 76815 OB US LIMITED FETUS(S): CPT | Performed by: OBSTETRICS & GYNECOLOGY

## 2019-06-11 PROCEDURE — 36415 COLL VENOUS BLD VENIPUNCTURE: CPT

## 2019-06-11 PROCEDURE — 99213 OFFICE O/P EST LOW 20 MIN: CPT | Performed by: NURSE PRACTITIONER

## 2019-06-14 ENCOUNTER — ROUTINE PRENATAL (OUTPATIENT)
Dept: PERINATAL CARE | Facility: CLINIC | Age: 34
End: 2019-06-14

## 2019-06-14 ENCOUNTER — DOCUMENTATION (OUTPATIENT)
Dept: PERINATAL CARE | Facility: CLINIC | Age: 34
End: 2019-06-14

## 2019-06-14 ENCOUNTER — HOSPITAL ENCOUNTER (OUTPATIENT)
Facility: HOSPITAL | Age: 34
Discharge: HOME | End: 2019-06-14
Attending: OBSTETRICS & GYNECOLOGY | Admitting: OBSTETRICS & GYNECOLOGY

## 2019-06-14 VITALS
HEIGHT: 60 IN | RESPIRATION RATE: 17 BRPM | SYSTOLIC BLOOD PRESSURE: 103 MMHG | WEIGHT: 168 LBS | BODY MASS INDEX: 32.98 KG/M2 | DIASTOLIC BLOOD PRESSURE: 63 MMHG | HEART RATE: 72 BPM | TEMPERATURE: 98.2 F

## 2019-06-14 VITALS
SYSTOLIC BLOOD PRESSURE: 103 MMHG | DIASTOLIC BLOOD PRESSURE: 68 MMHG | HEART RATE: 83 BPM | BODY MASS INDEX: 32.98 KG/M2 | WEIGHT: 168 LBS | HEIGHT: 60 IN

## 2019-06-14 DIAGNOSIS — E07.9 THYROID DYSFUNCTION IN PREGNANCY, ANTEPARTUM, THIRD TRIMESTER: ICD-10-CM

## 2019-06-14 DIAGNOSIS — O24.414 INSULIN CONTROLLED GESTATIONAL DIABETES MELLITUS (GDM) IN THIRD TRIMESTER: ICD-10-CM

## 2019-06-14 DIAGNOSIS — Z3A.37 37 WEEKS GESTATION OF PREGNANCY: ICD-10-CM

## 2019-06-14 DIAGNOSIS — O99.283 THYROID DYSFUNCTION IN PREGNANCY, ANTEPARTUM, THIRD TRIMESTER: ICD-10-CM

## 2019-06-14 DIAGNOSIS — O23.41 URINARY TRACT INFECTION IN MOTHER DURING FIRST TRIMESTER OF PREGNANCY: ICD-10-CM

## 2019-06-14 DIAGNOSIS — O09.293 HISTORY OF STILLBIRTH IN CURRENTLY PREGNANT PATIENT, THIRD TRIMESTER: ICD-10-CM

## 2019-06-14 DIAGNOSIS — D25.9 LEIOMYOMA OF UTERUS AFFECTING PREGNANCY IN THIRD TRIMESTER: ICD-10-CM

## 2019-06-14 DIAGNOSIS — O34.13 LEIOMYOMA OF UTERUS AFFECTING PREGNANCY IN THIRD TRIMESTER: ICD-10-CM

## 2019-06-14 PROBLEM — O36.8130 DECREASED FETAL MOVEMENT AFFECTING MANAGEMENT OF PREGNANCY IN THIRD TRIMESTER: Status: ACTIVE | Noted: 2019-06-14

## 2019-06-14 LAB — GLUCOSE SERPL-MCNC: 88 MG/DL (ref 65–140)

## 2019-06-14 PROCEDURE — 59025 FETAL NON-STRESS TEST: CPT | Performed by: OBSTETRICS & GYNECOLOGY

## 2019-06-14 PROCEDURE — 76819 FETAL BIOPHYS PROFIL W/O NST: CPT | Performed by: OBSTETRICS & GYNECOLOGY

## 2019-06-14 PROCEDURE — 99213 OFFICE O/P EST LOW 20 MIN: CPT

## 2019-06-14 PROCEDURE — 82948 REAGENT STRIP/BLOOD GLUCOSE: CPT

## 2019-06-14 PROCEDURE — 99213 OFFICE O/P EST LOW 20 MIN: CPT | Performed by: OBSTETRICS & GYNECOLOGY

## 2019-06-14 PROCEDURE — 76815 OB US LIMITED FETUS(S): CPT | Performed by: OBSTETRICS & GYNECOLOGY

## 2019-06-14 PROCEDURE — 99212 OFFICE O/P EST SF 10 MIN: CPT | Performed by: OBSTETRICS & GYNECOLOGY

## 2019-06-14 PROCEDURE — 76818 FETAL BIOPHYS PROFILE W/NST: CPT | Performed by: OBSTETRICS & GYNECOLOGY

## 2019-06-15 DIAGNOSIS — O24.414 INSULIN CONTROLLED GESTATIONAL DIABETES MELLITUS (GDM) IN THIRD TRIMESTER: ICD-10-CM

## 2019-06-16 ENCOUNTER — ANESTHESIA (INPATIENT)
Dept: ANESTHESIOLOGY | Facility: HOSPITAL | Age: 34
DRG: 560 | End: 2019-06-16
Payer: COMMERCIAL

## 2019-06-16 ENCOUNTER — ANESTHESIA EVENT (INPATIENT)
Dept: ANESTHESIOLOGY | Facility: HOSPITAL | Age: 34
DRG: 560 | End: 2019-06-16
Payer: COMMERCIAL

## 2019-06-16 ENCOUNTER — HOSPITAL ENCOUNTER (INPATIENT)
Facility: HOSPITAL | Age: 34
LOS: 2 days | Discharge: HOME | DRG: 560 | End: 2019-06-18
Attending: OBSTETRICS & GYNECOLOGY | Admitting: OBSTETRICS & GYNECOLOGY
Payer: COMMERCIAL

## 2019-06-16 DIAGNOSIS — Z3A.37 37 WEEKS GESTATION OF PREGNANCY: Primary | ICD-10-CM

## 2019-06-16 LAB
ABO GROUP BLD: NORMAL
BLD GP AB SCN SERPL QL: NEGATIVE
ERYTHROCYTE [DISTWIDTH] IN BLOOD BY AUTOMATED COUNT: 13.2 % (ref 11.6–15.1)
GLUCOSE SERPL-MCNC: 62 MG/DL (ref 65–140)
GLUCOSE SERPL-MCNC: 63 MG/DL (ref 65–140)
GLUCOSE SERPL-MCNC: 65 MG/DL (ref 65–140)
GLUCOSE SERPL-MCNC: 66 MG/DL (ref 65–140)
GLUCOSE SERPL-MCNC: 70 MG/DL (ref 65–140)
GLUCOSE SERPL-MCNC: 71 MG/DL (ref 65–140)
GLUCOSE SERPL-MCNC: 83 MG/DL (ref 65–140)
GLUCOSE SERPL-MCNC: 98 MG/DL (ref 65–140)
HCT VFR BLD AUTO: 40 % (ref 34.8–46.1)
HGB BLD-MCNC: 13 G/DL (ref 11.5–15.4)
MCH RBC QN AUTO: 29 PG (ref 26.8–34.3)
MCHC RBC AUTO-ENTMCNC: 32.5 G/DL (ref 31.4–37.4)
MCV RBC AUTO: 89 FL (ref 82–98)
PLATELET # BLD AUTO: 196 THOUSANDS/UL (ref 149–390)
PMV BLD AUTO: 9.9 FL (ref 8.9–12.7)
RBC # BLD AUTO: 4.49 MILLION/UL (ref 3.81–5.12)
RH BLD: POSITIVE
SPECIMEN EXPIRATION DATE: NORMAL
WBC # BLD AUTO: 9.36 THOUSAND/UL (ref 4.31–10.16)

## 2019-06-16 PROCEDURE — 85027 COMPLETE CBC AUTOMATED: CPT | Performed by: STUDENT IN AN ORGANIZED HEALTH CARE EDUCATION/TRAINING PROGRAM

## 2019-06-16 PROCEDURE — NC001 PR NO CHARGE: Performed by: STUDENT IN AN ORGANIZED HEALTH CARE EDUCATION/TRAINING PROGRAM

## 2019-06-16 PROCEDURE — 99215 OFFICE O/P EST HI 40 MIN: CPT

## 2019-06-16 PROCEDURE — 86901 BLOOD TYPING SEROLOGIC RH(D): CPT | Performed by: STUDENT IN AN ORGANIZED HEALTH CARE EDUCATION/TRAINING PROGRAM

## 2019-06-16 PROCEDURE — 86592 SYPHILIS TEST NON-TREP QUAL: CPT | Performed by: STUDENT IN AN ORGANIZED HEALTH CARE EDUCATION/TRAINING PROGRAM

## 2019-06-16 PROCEDURE — 86900 BLOOD TYPING SEROLOGIC ABO: CPT | Performed by: STUDENT IN AN ORGANIZED HEALTH CARE EDUCATION/TRAINING PROGRAM

## 2019-06-16 PROCEDURE — 86850 RBC ANTIBODY SCREEN: CPT | Performed by: STUDENT IN AN ORGANIZED HEALTH CARE EDUCATION/TRAINING PROGRAM

## 2019-06-16 PROCEDURE — 82948 REAGENT STRIP/BLOOD GLUCOSE: CPT

## 2019-06-16 RX ORDER — BUTORPHANOL TARTRATE 1 MG/ML
1 INJECTION, SOLUTION INTRAMUSCULAR; INTRAVENOUS ONCE
Status: COMPLETED | OUTPATIENT
Start: 2019-06-16 | End: 2019-06-16

## 2019-06-16 RX ORDER — ONDANSETRON 2 MG/ML
4 INJECTION INTRAMUSCULAR; INTRAVENOUS EVERY 6 HOURS PRN
Status: DISCONTINUED | OUTPATIENT
Start: 2019-06-16 | End: 2019-06-17

## 2019-06-16 RX ORDER — SODIUM CHLORIDE 9 MG/ML
125 INJECTION, SOLUTION INTRAVENOUS CONTINUOUS
Status: DISCONTINUED | OUTPATIENT
Start: 2019-06-16 | End: 2019-06-18 | Stop reason: HOSPADM

## 2019-06-16 RX ORDER — LEVOTHYROXINE SODIUM 0.07 MG/1
75 TABLET ORAL
Status: DISCONTINUED | OUTPATIENT
Start: 2019-06-16 | End: 2019-06-17

## 2019-06-16 RX ADMIN — MISOPROSTOL 25 MCG: 100 TABLET ORAL at 12:06

## 2019-06-16 RX ADMIN — MISOPROSTOL 25 MCG: 100 TABLET ORAL at 15:54

## 2019-06-16 RX ADMIN — SODIUM CHLORIDE 125 ML/HR: 0.9 INJECTION, SOLUTION INTRAVENOUS at 15:56

## 2019-06-16 RX ADMIN — SODIUM CHLORIDE 1000 ML: 0.9 INJECTION, SOLUTION INTRAVENOUS at 11:55

## 2019-06-16 RX ADMIN — BUTORPHANOL TARTRATE 1 MG: 1 INJECTION, SOLUTION INTRAMUSCULAR; INTRAVENOUS at 21:05

## 2019-06-16 RX ADMIN — SODIUM CHLORIDE 125 ML/HR: 0.9 INJECTION, SOLUTION INTRAVENOUS at 23:46

## 2019-06-16 RX ADMIN — MISOPROSTOL 25 MCG: 100 TABLET ORAL at 20:49

## 2019-06-17 LAB
BASE EXCESS BLDCOA CALC-SCNC: -8 MMOL/L (ref 3–11)
BASE EXCESS BLDCOV CALC-SCNC: -4.4 MMOL/L (ref 1–9)
GLUCOSE SERPL-MCNC: 64 MG/DL (ref 65–140)
GLUCOSE SERPL-MCNC: 66 MG/DL (ref 65–140)
GLUCOSE SERPL-MCNC: 71 MG/DL (ref 65–140)
GLUCOSE SERPL-MCNC: 76 MG/DL (ref 65–140)
GLUCOSE SERPL-MCNC: 97 MG/DL (ref 65–140)
HCO3 BLDCOA-SCNC: 20.7 MMOL/L (ref 17.3–27.3)
HCO3 BLDCOV-SCNC: 21.7 MMOL/L (ref 12.2–28.6)
O2 CT VFR BLDCOA CALC: 11.5 ML/DL
OXYHGB MFR BLDCOA: 46 %
OXYHGB MFR BLDCOV: 59.7 %
PCO2 BLDCOA: 53.9 MM[HG] (ref 30–60)
PCO2 BLDCOV: 43.2 MM HG (ref 27–43)
PH BLDCOA: 7.2 [PH] (ref 7.23–7.43)
PH BLDCOV: 7.32 [PH] (ref 7.19–7.49)
PO2 BLDCOA: 22.7 MM HG (ref 5–25)
PO2 BLDCOV: 25.7 MM HG (ref 15–45)
RPR SER QL: NORMAL
SAO2 % BLDCOV: 13.9 ML/DL

## 2019-06-17 PROCEDURE — 82805 BLOOD GASES W/O2 SATURATION: CPT | Performed by: OBSTETRICS & GYNECOLOGY

## 2019-06-17 PROCEDURE — 0KQM0ZZ REPAIR PERINEUM MUSCLE, OPEN APPROACH: ICD-10-PCS | Performed by: OBSTETRICS & GYNECOLOGY

## 2019-06-17 PROCEDURE — 3E033VJ INTRODUCTION OF OTHER HORMONE INTO PERIPHERAL VEIN, PERCUTANEOUS APPROACH: ICD-10-PCS | Performed by: OBSTETRICS & GYNECOLOGY

## 2019-06-17 PROCEDURE — 4A1HXCZ MONITORING OF PRODUCTS OF CONCEPTION, CARDIAC RATE, EXTERNAL APPROACH: ICD-10-PCS | Performed by: OBSTETRICS & GYNECOLOGY

## 2019-06-17 PROCEDURE — NC001 PR NO CHARGE: Performed by: STUDENT IN AN ORGANIZED HEALTH CARE EDUCATION/TRAINING PROGRAM

## 2019-06-17 PROCEDURE — 10907ZC DRAINAGE OF AMNIOTIC FLUID, THERAPEUTIC FROM PRODUCTS OF CONCEPTION, VIA NATURAL OR ARTIFICIAL OPENING: ICD-10-PCS | Performed by: OBSTETRICS & GYNECOLOGY

## 2019-06-17 PROCEDURE — 59410 OBSTETRICAL CARE: CPT | Performed by: OBSTETRICS & GYNECOLOGY

## 2019-06-17 PROCEDURE — 88307 TISSUE EXAM BY PATHOLOGIST: CPT | Performed by: PATHOLOGY

## 2019-06-17 PROCEDURE — 3E0P7VZ INTRODUCTION OF HORMONE INTO FEMALE REPRODUCTIVE, VIA NATURAL OR ARTIFICIAL OPENING: ICD-10-PCS | Performed by: OBSTETRICS & GYNECOLOGY

## 2019-06-17 PROCEDURE — 82948 REAGENT STRIP/BLOOD GLUCOSE: CPT

## 2019-06-17 RX ORDER — OXYCODONE HYDROCHLORIDE AND ACETAMINOPHEN 5; 325 MG/1; MG/1
1 TABLET ORAL EVERY 4 HOURS PRN
Status: DISCONTINUED | OUTPATIENT
Start: 2019-06-17 | End: 2019-06-18 | Stop reason: HOSPADM

## 2019-06-17 RX ORDER — OXYTOCIN/RINGER'S LACTATE 30/500 ML
PLASTIC BAG, INJECTION (ML) INTRAVENOUS
Status: COMPLETED
Start: 2019-06-17 | End: 2019-06-17

## 2019-06-17 RX ORDER — ONDANSETRON 2 MG/ML
4 INJECTION INTRAMUSCULAR; INTRAVENOUS EVERY 8 HOURS PRN
Status: DISCONTINUED | OUTPATIENT
Start: 2019-06-17 | End: 2019-06-18 | Stop reason: HOSPADM

## 2019-06-17 RX ORDER — DIAPER,BRIEF,INFANT-TODD,DISP
1 EACH MISCELLANEOUS AS NEEDED
Status: DISCONTINUED | OUTPATIENT
Start: 2019-06-17 | End: 2019-06-18 | Stop reason: HOSPADM

## 2019-06-17 RX ORDER — LIDOCAINE HYDROCHLORIDE AND EPINEPHRINE 15; 5 MG/ML; UG/ML
INJECTION, SOLUTION EPIDURAL
Status: COMPLETED | OUTPATIENT
Start: 2019-06-17 | End: 2019-06-17

## 2019-06-17 RX ORDER — OXYCODONE HYDROCHLORIDE 10 MG/1
10 TABLET ORAL EVERY 4 HOURS PRN
Status: CANCELLED | OUTPATIENT
Start: 2019-06-17

## 2019-06-17 RX ORDER — OXYTOCIN/RINGER'S LACTATE 30/500 ML
1-30 PLASTIC BAG, INJECTION (ML) INTRAVENOUS
Status: DISCONTINUED | OUTPATIENT
Start: 2019-06-17 | End: 2019-06-17

## 2019-06-17 RX ORDER — ACETAMINOPHEN 325 MG/1
650 TABLET ORAL EVERY 6 HOURS PRN
Status: CANCELLED | OUTPATIENT
Start: 2019-06-17

## 2019-06-17 RX ORDER — OXYCODONE HYDROCHLORIDE AND ACETAMINOPHEN 5; 325 MG/1; MG/1
2 TABLET ORAL EVERY 4 HOURS PRN
Status: DISCONTINUED | OUTPATIENT
Start: 2019-06-17 | End: 2019-06-18 | Stop reason: HOSPADM

## 2019-06-17 RX ORDER — IBUPROFEN 600 MG/1
600 TABLET ORAL EVERY 6 HOURS PRN
Status: DISCONTINUED | OUTPATIENT
Start: 2019-06-17 | End: 2019-06-18 | Stop reason: HOSPADM

## 2019-06-17 RX ORDER — ACETAMINOPHEN 325 MG/1
650 TABLET ORAL EVERY 6 HOURS PRN
Status: DISCONTINUED | OUTPATIENT
Start: 2019-06-17 | End: 2019-06-18 | Stop reason: HOSPADM

## 2019-06-17 RX ORDER — LEVOTHYROXINE SODIUM 0.07 MG/1
75 TABLET ORAL
Status: DISCONTINUED | OUTPATIENT
Start: 2019-06-17 | End: 2019-06-18 | Stop reason: HOSPADM

## 2019-06-17 RX ORDER — DOCUSATE SODIUM 100 MG/1
100 CAPSULE, LIQUID FILLED ORAL 2 TIMES DAILY
Status: DISCONTINUED | OUTPATIENT
Start: 2019-06-17 | End: 2019-06-18 | Stop reason: HOSPADM

## 2019-06-17 RX ORDER — ONDANSETRON 2 MG/ML
4 INJECTION INTRAMUSCULAR; INTRAVENOUS EVERY 6 HOURS PRN
Status: CANCELLED | OUTPATIENT
Start: 2019-06-17

## 2019-06-17 RX ORDER — CALCIUM CARBONATE 200(500)MG
1000 TABLET,CHEWABLE ORAL DAILY PRN
Status: DISCONTINUED | OUTPATIENT
Start: 2019-06-17 | End: 2019-06-18 | Stop reason: HOSPADM

## 2019-06-17 RX ORDER — OXYCODONE HYDROCHLORIDE 5 MG/1
5 TABLET ORAL EVERY 4 HOURS PRN
Status: CANCELLED | OUTPATIENT
Start: 2019-06-17

## 2019-06-17 RX ORDER — DIPHENHYDRAMINE HCL 25 MG
25 TABLET ORAL EVERY 6 HOURS PRN
Status: DISCONTINUED | OUTPATIENT
Start: 2019-06-17 | End: 2019-06-18 | Stop reason: HOSPADM

## 2019-06-17 RX ADMIN — SODIUM CHLORIDE 125 ML/HR: 0.9 INJECTION, SOLUTION INTRAVENOUS at 04:50

## 2019-06-17 RX ADMIN — LIDOCAINE HYDROCHLORIDE AND EPINEPHRINE 5 ML: 15; 5 INJECTION, SOLUTION EPIDURAL at 06:44

## 2019-06-17 RX ADMIN — DOCUSATE SODIUM 100 MG: 100 CAPSULE, LIQUID FILLED ORAL at 12:09

## 2019-06-17 RX ADMIN — BENZOCAINE AND LEVOMENTHOL: 200; 5 SPRAY TOPICAL at 12:10

## 2019-06-17 RX ADMIN — DOCUSATE SODIUM 100 MG: 100 CAPSULE, LIQUID FILLED ORAL at 17:38

## 2019-06-17 RX ADMIN — SODIUM CHLORIDE 125 ML/HR: 0.9 INJECTION, SOLUTION INTRAVENOUS at 09:55

## 2019-06-17 RX ADMIN — IBUPROFEN 600 MG: 600 TABLET ORAL at 17:53

## 2019-06-17 RX ADMIN — IBUPROFEN 600 MG: 600 TABLET ORAL at 12:09

## 2019-06-17 RX ADMIN — LEVOTHYROXINE SODIUM 75 MCG: 75 TABLET ORAL at 15:59

## 2019-06-17 RX ADMIN — ROPIVACAINE HYDROCHLORIDE: 2 INJECTION, SOLUTION EPIDURAL; INFILTRATION at 06:55

## 2019-06-17 RX ADMIN — HYDROCORTISONE 1 APPLICATION: 1 CREAM TOPICAL at 12:10

## 2019-06-17 RX ADMIN — WITCH HAZEL 1 PAD: 500 SOLUTION RECTAL; TOPICAL at 12:10

## 2019-06-17 RX ADMIN — Medication 2 MILLI-UNITS/MIN: at 06:52

## 2019-06-17 RX ADMIN — SODIUM CHLORIDE 999 ML/HR: 0.9 INJECTION, SOLUTION INTRAVENOUS at 07:00

## 2019-06-18 VITALS
SYSTOLIC BLOOD PRESSURE: 121 MMHG | TEMPERATURE: 98.5 F | BODY MASS INDEX: 32.98 KG/M2 | RESPIRATION RATE: 18 BRPM | HEART RATE: 86 BPM | DIASTOLIC BLOOD PRESSURE: 77 MMHG | WEIGHT: 168 LBS | HEIGHT: 60 IN | OXYGEN SATURATION: 98 %

## 2019-06-18 PROBLEM — E03.9 HYPOTHYROID: Chronic | Status: ACTIVE | Noted: 2018-11-27

## 2019-06-18 PROBLEM — Z87.59 HISTORY OF STILLBIRTH: Status: RESOLVED | Noted: 2019-02-12 | Resolved: 2019-06-18

## 2019-06-18 PROBLEM — D21.9 LEIOMYOMA: Chronic | Status: ACTIVE | Noted: 2018-12-14

## 2019-06-18 PROBLEM — Z87.59 HISTORY OF STILLBIRTH: Status: ACTIVE | Noted: 2019-02-12

## 2019-06-18 PROBLEM — Z3A.37 37 WEEKS GESTATION OF PREGNANCY: Status: ACTIVE | Noted: 2019-06-18

## 2019-06-18 PROCEDURE — 99024 POSTOP FOLLOW-UP VISIT: CPT | Performed by: OBSTETRICS & GYNECOLOGY

## 2019-06-18 RX ORDER — IBUPROFEN 600 MG/1
600 TABLET ORAL EVERY 6 HOURS PRN
Qty: 30 TABLET | Refills: 0 | Status: SHIPPED | OUTPATIENT
Start: 2019-06-18 | End: 2019-07-16

## 2019-06-18 RX ORDER — ACETAMINOPHEN AND CODEINE PHOSPHATE 120; 12 MG/5ML; MG/5ML
1 SOLUTION ORAL DAILY
Qty: 28 TABLET | Refills: 1 | Status: SHIPPED | OUTPATIENT
Start: 2019-06-18

## 2019-06-18 RX ORDER — IBUPROFEN 600 MG/1
600 TABLET ORAL EVERY 6 HOURS PRN
Qty: 30 TABLET | Refills: 0 | Status: SHIPPED | OUTPATIENT
Start: 2019-06-18 | End: 2019-06-18

## 2019-06-18 RX ORDER — DIAPER,BRIEF,INFANT-TODD,DISP
1 EACH MISCELLANEOUS AS NEEDED
Qty: 30 G | Refills: 0 | Status: SHIPPED | OUTPATIENT
Start: 2019-06-18 | End: 2019-07-16

## 2019-06-18 RX ORDER — ACETAMINOPHEN 325 MG/1
650 TABLET ORAL EVERY 4 HOURS PRN
Qty: 30 TABLET | Refills: 0 | Status: SHIPPED | OUTPATIENT
Start: 2019-06-18 | End: 2019-07-16

## 2019-06-18 RX ORDER — DOCUSATE SODIUM 100 MG/1
100 CAPSULE, LIQUID FILLED ORAL 2 TIMES DAILY
Qty: 10 CAPSULE | Refills: 0 | Status: SHIPPED | OUTPATIENT
Start: 2019-06-18 | End: 2019-07-16

## 2019-06-18 RX ORDER — ACETAMINOPHEN 325 MG/1
650 TABLET ORAL EVERY 6 HOURS PRN
Qty: 30 TABLET | Refills: 0 | Status: SHIPPED | OUTPATIENT
Start: 2019-06-18 | End: 2019-06-18

## 2019-06-18 RX ORDER — DIPHENHYDRAMINE HCL 25 MG
25 TABLET ORAL EVERY 6 HOURS PRN
Qty: 30 TABLET | Refills: 0 | Status: SHIPPED | OUTPATIENT
Start: 2019-06-18 | End: 2019-06-18 | Stop reason: HOSPADM

## 2019-06-18 RX ORDER — CALCIUM CARBONATE 200(500)MG
1000 TABLET,CHEWABLE ORAL DAILY PRN
Refills: 0 | Status: SHIPPED | OUTPATIENT
Start: 2019-06-18 | End: 2019-07-16

## 2019-06-18 RX ADMIN — IBUPROFEN 600 MG: 600 TABLET ORAL at 07:40

## 2019-06-18 RX ADMIN — LEVOTHYROXINE SODIUM 75 MCG: 75 TABLET ORAL at 07:30

## 2019-06-18 RX ADMIN — DOCUSATE SODIUM 100 MG: 100 CAPSULE, LIQUID FILLED ORAL at 07:40

## 2019-06-27 ENCOUNTER — TELEPHONE (OUTPATIENT)
Dept: OBGYN CLINIC | Facility: CLINIC | Age: 34
End: 2019-06-27

## 2019-07-16 ENCOUNTER — OFFICE VISIT (OUTPATIENT)
Dept: OBGYN CLINIC | Facility: CLINIC | Age: 34
End: 2019-07-16

## 2019-07-16 ENCOUNTER — PATIENT OUTREACH (OUTPATIENT)
Dept: OBGYN CLINIC | Facility: CLINIC | Age: 34
End: 2019-07-16

## 2019-07-16 VITALS
HEIGHT: 60 IN | WEIGHT: 153 LBS | BODY MASS INDEX: 30.04 KG/M2 | SYSTOLIC BLOOD PRESSURE: 119 MMHG | DIASTOLIC BLOOD PRESSURE: 62 MMHG | HEART RATE: 86 BPM

## 2019-07-16 DIAGNOSIS — Z30.011 ENCOUNTER FOR INITIAL PRESCRIPTION OF CONTRACEPTIVE PILLS: ICD-10-CM

## 2019-07-16 DIAGNOSIS — E03.9 HYPOTHYROIDISM, UNSPECIFIED TYPE: ICD-10-CM

## 2019-07-16 DIAGNOSIS — Z86.32 HISTORY OF INSULIN CONTROLLED GESTATIONAL DIABETES MELLITUS (GDM): ICD-10-CM

## 2019-07-16 PROCEDURE — 99213 OFFICE O/P EST LOW 20 MIN: CPT | Performed by: NURSE PRACTITIONER

## 2019-07-16 RX ORDER — LEVOTHYROXINE SODIUM 0.07 MG/1
75 TABLET ORAL DAILY
COMMUNITY
End: 2019-08-16 | Stop reason: SDUPTHER

## 2019-07-16 NOTE — PATIENT INSTRUCTIONS
Depresión posparto   LO QUE NECESITA SABER:   ¿Qué es la depresión posparto? La depresión posparto es un trastorno del humor que se presenta después de ramin a derrick un bebé  Un estado de ánimo es nicholas emoción o un sentimiento  Los estados de ánimo afectan nuestro comportamiento y la manera en que nos sentimos acerca de nosotros mismos y de la alexa en general  La depresión es un estado de ánimo de tristeza que no puede controlar  A menudo las mujeres sienten tristeza, temor o nerviosismo después de tener un bebé  Estas emociones se conocen russell melancolía posparto o melancolía por bebé, y por lo general desaparecen en 1 o 2 semanas  Con la depresión posparto, estos síntomas empeoran y tavarez más de 2 [address]  La depresión posparto es nicholas condición grave que afecta randall relaciones personales y randall actividades diarias  ¿Cuáles son las causas de la depresión posparto? Los médicos no saben con exactitud cuáles son las causas de la depresión posparto  Es posible que sea el resultado de nicholas disminución súbita de los niveles de hormonas después de ramin a derrick un bebé  Es posible que corra un mayor riesgo de tener depresión posparto si ha tenido un episodio previo o si hay un historial de depresión en delaney rocael  Varios factores pueden desencadenar la depresión posparto:  · Falta de apoyo por parte del padre del bebé u otros integrantes de delaney rocael    · Sentir más cansancio de lo usual    · Estrés, adrien alimentación o falta de sueño    · Dolor después del parto o al [employer] al bebé    · Cambio súbito en el estilo de alexa  ¿Cómo se diagnostica la depresión posparto? La depresión posparto afecta randall actividades diarias y randall relaciones con los demás  Los médicos le harán preguntas acerca de randall signos y síntomas, y cómo influyen en delaney alexa  Los síntomas de la depresión posparto por lo general comienzan 1 mes después de ramin a derrick   Se siente deprimida o pierde interés en las actividades que disfruta chapo a diario ariel al menos 2 semanas  También tiene 4 o más de los siguientes síntomas:  · Se siente cansado o tiene menos energía que de costumbre  · Se siente insignificante o culpable la mayor parte del tiempo  · Considera la posibilidad de hacerse daño o de quitarse la alexa  · Delaney apetito cambia  Es posible que pierda el apetito y baje de peso sin proponérselo  También es posible que tenga más apetito y que aumente de [place]  · Se siente inquieto, irritable o retraído  · Tiene dificultad para concentrarse y recordar las cosas  Tiene dificultad para llevar a cabo randall tareas cotidianas o lisette decisiones  · Tiene dificultad para dormir, incluso después de que el bebé se ha dormido  ¿Cómo se trata la depresión posparto? · Psicoterapia:  Ariel la terapia usted hablará con los médicos acerca de cómo afrontar randall sentimientos y estados de ánimo  La terapia puede ser individual o grupal  También puede hacer terapia con randall familiares o con delaney socorro  · Antidepresivos:  Dueñas se administra para disminuir o evitar los síntomas de la depresión  Por lo general hace falta lisette los antidepresivos ariel varias semanas para empezar a sentirse mejor  No suspenda los antidepresivos, a menos que delaney médico le indique que lo gini  Los médicos podrían recetarle un antidepresivo diferente si un tipo de antidepresivo no le da resultado  ¿Qué puedo hacer para sentirme mejor? · Descanse:  No trate de hacer todo al mismo tiempo  Gini solamente lo necesario y deje las otras cosas para más tarde  Pida ayuda a delaney rocael o a randall amigos, especialmente si tiene otros hijos  Pida a delaney socorro que la ayude a cuidar al bebé o a alimentarlo por la noche  Trate de dormir cuando el bebé la nena nicholas siesta  · Reciba apoyo emocional:  Comparta randall sentimientos con delaney socorro, un amigo o con otra madre  · Cuídese:  avocarrot y [employer] días  No se salte ninguna comida  Trate de salir de delaney casa a diario ariel un rato  Ejercítese regularmente  Siga nicholas dieta saludable  No consuma bebidas alcohólicas, ya que pueden empeorar la depresión  No se aísle  Salga a caminar o encuéntrese con un Dierdre Gerold  También es importante que pase tiempo landon todos los días  ¿Cómo puedo conseguir grupos de [de-identified] y mayor información? · [address] [facility]  [address], [address] [facility], [address]  [place] [address]   Phone: [phone]  Phone: [phone]  Web Address: [employer] tn  ¿Cuándo jessenia comunicarme con mi médico?   · No puede asistir a smith próxima renato  · Smith depresión no mejora con el tratamiento o empeora  · Usted tiene preguntas o inquietudes acerca de smith condición o cuidado  ¿Cuándo jessenia buscar atención inmediata o llamar al 911? · Considera la posibilidad de hacerse daño o quitarse la alexa o de lastimar o matar a smith bebé o a otra persona  · Siente que otras personas quieren hacerle daño  · Escucha voces que le dicen que se jocelyn daño a usted misma o a smith bebé  ACUERDOS SOBRE SMITH CUIDADO:   Usted tiene el derecho de ayudar a planear smith cuidado  Aprenda todo lo que pueda sobre smith condición y russell darle tratamiento  Discuta randall opciones de tratamiento con randall médicos para decidir el cuidado que usted desea recibir  Usted siempre tiene el derecho de rechazar el tratamiento  Esta información es sólo para uso en educación  Smith intención no es darle un consejo médico sobre enfermedades o tratamientos  Colsulte con smith Alben Nate [facility]ico antes de seguir cualquier régimen médico para saber si es seguro y efectivo para usted  © 2017 [address] Information is for End User's use only and may not be sold, redistributed or otherwise used for commercial purposes  All illustrations and images included in CareNotes® are the copyrighted property of A D A M , Inc  or Chris Loja posparto   LO QUE NECESITA SABER:   ¿Qué es el sangrado posparto? El sangrado posparto es un sangrado vaginal después de ramin a derrick al bebé  Monik sangrado es normal tanto si tuvo un parto vaginal russell si se trata de nicholas cesárea  Se compone de [place] y del tejido que recubrían el interior de smith útero cuando estaba Hugo Rendon  ¿Qué jessenia esperar del sangrado posparto? El sangrado posparto usualmente tiene nicholas duración de 10 angelique y podría durar hasta 6 semanas  Smith sangrado puede variar de leve (chapo no janet nicholas toalla higiénica) a ser profuso (satura nicholas toalla higiénica en 1 hora)  Usualmente, usted tiene un sangrado profuso charlie después del parto, el cual disminuye en las próximas de semanas hasta que cesa completamente  El sangrado es connell o delgado oscuro con coágulos por los primeros 1 a 3 días  Después se torna sewell por varias semanas y luego se vuelve un flujo grey o amarillo hasta que cesa por completo  ¿Cuándo jessenia comunicarme con mi médico?   · El sangrado [place] o usted tiene nicholas hemorragia que satura nicholas toalla higiénica en 1 hora por 2 horas seguidas  · Le salen grandes coágulos de [place]  · Usted está respirando más rápido que lo acostumbrado o smith corazón late más rápido que lo habitual     · Usted está orinando menos que de [place], o nada en lo absoluto  · Usted se siente mareado  · Usted tiene preguntas o inquietudes acerca de smith condición o cuidado  ¿Cuándo jessenia buscar atención inmediata o llamar al 911? · A usted de repente le hace falta el aire y se siente desvanecer  · Tiene dolor repentino en el pecho  ACUERDOS SOBRE SMITH CUIDADO:   Usted tiene el derecho de ayudar a planear smith cuidado  Aprenda todo lo que pueda sobre smith condición y russell darle tratamiento  Discuta randall opciones de tratamiento con randall médicos para decidir el cuidado que usted desea recibir  Usted siempre tiene el derecho de rechazar el tratamiento  Esta información es sólo para uso en educación   Smith intención no es darle un consejo médico sobre enfermedades o tratamientos  Colsulte con gilbert Melven Rimes farmacéutico antes de seguir cualquier régimen médico para saber si es seguro y efectivo para usted  © 2017 [address] Justin Gardiner Information is for End User's use only and may not be sold, redistributed or otherwise used for commercial purposes  All illustrations and images included in CareNotes® are the copyrighted property of A D A M , Inc  or Chris Edge  Etinilestradiol/acetato de noretinodrona (Por la boca)   Marina un embarazo  También sirve para tratar los sofocos ariel la menopausia y [place] a prevenir la osteoporosis después de la menopausia  Vianey(s) : Bettie Bustos [address], [place] Fe 1 5/30, [place] Fe 1/20, [place] Fe, Femhrt, Femhrt 1/5, HARJAVALTA, Gildess 1 5/30, Gildess 1/20, Gildess FE 1 5/30, Gildess FE 1/20, Jevantique Lo, [place] Junel 1 5/30, Junel 1/20   Existen muchas otras marcas de Waldemar  Monik medicamento no debe ser usado cuando:   Monik medicamento no es adecuado para todas las personas  No lo utilice si alguna vez velazquez tenido Tonnie Poles reacción alérgica al etinilestradiol o a la noretindrona o si usted esta embarazada o si tiene sangrado vaginal inusual  No lo utilice si usted tiene enfermedad del hígado, servando de patti por migraña, cáncer de mama o problemas con coágulos de [place]  No lo use si usted tiene presión arterial neli, ciertos problemas del corazón, o diabetes con daños en el riñón, los ojos, nervios o vasos sanguíneos  Forma de usar monki medicamento:   Carvin Baumgarten  · Gilbert médico le indicara cuanto medicamento necesita usar  No use más medicamento de lo indicado  · Cada vianey de anticonceptivos orales tiene instrucciones específicas  Maryana y siga las instrucciones para gilbert vianey prescrita  Consulte con gilbert médico o farmacéutico si tiene cualquier pregunta  · [place] medicamento a la misma hora todos los días   Las píldoras anticonceptivas funcionan mejor cuando no hay más de 24 horas entre dosis  · La primera vez que usted utilice jasmyne medicamento, delaney cuerpo necesitará por lo menos 7 días para adaptarse antes de que se prevenga un embarazo  Use un hanny método anticonceptivo, russell el condón, espermicida o [employer] primeros 7 días de delaney primer ciclo de píldoras  · Maryana y siga las instrucciones para el paciente que vienen con el medicamento  Hable con delaney médico o farmacéutico si tiene alguna pregunta  · Si olvida nicholas dosis: Jasmyne medicamento tiene instrucciones específicas para el paciente sobre qué hacer si Richrd Yamila dosis  Maryana y siga estas instrucciones cuidadosamente y llame a delaney médico si tiene alguna pregunta  ¨ Asegúrese de que delaney médico sepa si usted no tuvo delaney periodo menstrual ariel 2 meses seguidos, ya que podría [facility] Es posible que no tenga el período de nadia mes si usted se [place] de lisette más de Fuad Newcomer dosis o cambie delaney horario  ¨ Usted podría tener un sangrado leve o manchas de nahomi si usted no la nena nicholas píldora puntualmente  Entre más píldoras deje de lisette, mayor será la posibilidad de presentar un sangrado  · Guarde el medicamento en un recipiente cerrado a temperatura ambiente y alejado del calor, la humedad y la derrick directa  Medicamentos y [employer] que debe evitar:   Consulte con delaney médico o farmacéutico antes de usar cualquier medicamento, incluyendo los que compra sin receta médica, las vitaminas y los productos herbales  · Algunos alimentos y medicamentos pueden afectar cómo funciona etinilestradiol en combinación con noretindrona   Informe a delaney médico si [address] de [facility], [address], aprepitant, aspirina, [place], boceprevir, bosentan, clofibrato, colesevelam, ciclosporina, morfina, prednisolona, rifabutina, rifampicina, la rosuvastatina , telaprevir, temazepam, teofilina, tizanidina, medicamentos para tratar Belkis Ryan, medicamentos para tratar el VIH/SIDA, medicamento para las convulsiones, medicamentos de reemplazo de tiroides, o cualquier otro medicamento que contiene hormonas, russell otros tratamientos para la menopausia  · No coma toronja ni tome jugo de toronja mientras esté   Precauciones ariel el uso de monik medicamento:   · Informe a gilbert médico de inmediato si usted piensa que está [place]  Monik medicamento podría hacerle daño a gilbert bebé que está por nacer si lo la nena ariel el embarazo  · Infórmele a gilbert médico si está dando de lactar, o si sorin a edrrick en las últimas 4 semanas antes de flori empezado a usar [place]  Informe a gilbert médico si usted tiene enfermedad del riñón, asma, cáncer de awais uterino, diabetes, epilepsia, problemas de vesícula biliar, enfermedad cardíaca o de los vasos sanguíneos, colesterol alto o antecedentes familiares de cáncer de mama o de depresión  Dígale a gilbert médico si usted fuma  · Monik medicamento puede causar los siguientes problemas:  ¨ Un riesgo más alto de ataque al corazón, derrame cerebral o coágulos de nahomi  ¨ Posible riesgo de cáncer de mama o de awais uterino  ¨ Cáncer de hígado o tumores  ¨ Cambios en la visión  ¨ Enfermedad de la vesícula  ¨ Presión arterial neli  · Es posible que usted necesite dejar de usar monik medicamento ariel unas semanas antes y después de Enrique Art cirugía debido al riesgo de desarrollar coágulos de nahomi  · Monik medicamento no la protegerá contra el VIH / Suleiman Robles u otras enfermedades de transmisión sexual   · Dígale a todo médico o dentista encargado de atenderle que usted está usando monik medicamento  Puede que monik medicamento afecte algunos resultados de [employer]  · Gilbert médico revisará los efectos de monik medicamento ariel las visitas regulares  Asista a todas randall citas  · Guarde todos los medicamentos fuera del alcance de los niños  Nunca comparta randall medicamentos con [employer]    Efectos secundarios que pueden presentarse ariel el uso de [place] medicamento:   Consulte inmediatamente con el médico si nota cualquiera de estos efectos secundarios:  · Reacción alérgica: Comezón o ronchas, hinchazón del anne-marie o las og, hinchazón u hormigueo en la boca o garganta, opresión en el pecho, dificultad para respirar  · Dolor en el pecho que puede extenderse, sudoración inusual, o desvanecimiento  · Orina oscura o heces pálidas, pérdida de apetito, dolor de estómago, piel u ojos amarillos  · Ritmo cardíaco acelerado o britt  · Adormecimiento o debilidad en un lado de gilbert cuerpo  · Dolor en la parte inferior de la pierna (pantorrilla)  · Dolor de patti súbito o britt, problemas con la visión, con el habla o para caminar  · Dificultad para respirar o si tose nahomi  · Sangrado vaginal o sangrado profuso inusual e inesperado  Consulte con el médico si nota los siguientes efectos secundarios menos graves:   · Depresión, cambios de humor  · Cambios en la visión o dificultad para usar lentes de contacto  Consulte con el médico si nota otros efectos secundarios que tavon son causados por jasmyne medicamento  Llame a gilbert médico para consultarle Tsering Luong puede notificar randall efectos secundarios al FDA al [phone]  © 2017 [address] Justin [address]iner Information is for End User's use only and may not be sold, redistributed or otherwise used for commercial purposes  Esta información es sólo para uso en educación  Gilbert intención no es darle un consejo médico sobre enfermedades o tratamientos  Colsulte con gilbert Clermont Livia farmacéutico antes de seguir cualquier régimen médico para saber si es seguro y efectivo para usted

## 2019-07-16 NOTE — PROGRESS NOTES
Chris Santiago is a 36 y o  y o  female [id number] who presents for a postpartum visit  She is 3 weeks postpartum following a spontaneous vaginal delivery on 6/17/19  Pregnancy was complicated by A2 GDM, hypothyroid (surgically induced partial thyroidectomy) , uterine fibroid, urinary tract infection, history of stillbirth and polyhydramnios  Patient was in induction of labor for nonreassuring fetal heart tones  The delivery was at 37 6 gestational weeks  Female infant weighing 7 lb 12 oz with Apgars of 9/9  Postpartum course has been uncomplicated  Baby's course has been uncomplicated  Baby is feeding by formula  Bleeding no bleeding  Bowel function is normal  Bladder function is normal  Patient is not sexually active  Contraception method is OCP (estrogen/progesterone) @ 8 weeks postpartum  Postpartum depression screening: negative  [place]- 4  Patient is planning to go back to work at 6 weeks postpartum  Does not need a work note at this time  The following portions of the patient's history were reviewed and updated as appropriate: allergies, current medications, past medical history, past social history, past surgical history and problem list     Review of Systems  Pertinent items are noted in HPI       Objective     /62  Wt 153lb   General:   appears stated age, cooperative, alert normal mood and affect   Neck: Neck: normal, supple,trachea midline, no masses   Heart: regular rate and rhythm, S1, S2 normal, no murmur, click, rub or gallop   Lungs: clear to auscultation bilaterally   Breasts: normal appearance, no masses or tenderness     Assessment/Plan     3 week  postpartum exam    Pap smear 12/4/18- NILM/ HR HPV negative     1  Contraception: OCP (estrogen/progesterone)      - patient would like to start combined oral contraceptives @ 8 weeks postpartum  Does not currently have a partner  Reviewed with patient may start combined oral contraceptives on 8/18/19    Reviewed with patient common side effects including irregular vaginal bleeding, breast tenderness and nausea  ACHES reviewed  Backup method for at least 7 days of use reviewed  Written information provided  2  A2 GDM     - 2 hour GTT ordered  Reviewed with patient have labs drawn after 4 weeks postpartum  3  Subclinical hypothyroid was treated during pregnancy  Partial thyroidectomy     -TSH with reflex T4 ordered     -referral provided to [address]   for patient to establish care  4  Patient verbalized understanding of support and educational opportunities [facility]  4   Signs and symptoms to report reviewed       Follow up in: 5 months for annual exam or sooner as needed

## 2019-07-16 NOTE — PROGRESS NOTES
ROCKY met with Pt for post partum follow up  Pt reported her and baby "Dina Fong" are doing very well  Mom is formula feeding her daughter  Denies any post partum depression signs  Pt has pediatrician in her area  Reported will use OCP as contraception   Pt denies any concern at this time

## 2019-08-15 ENCOUNTER — APPOINTMENT (OUTPATIENT)
Dept: LAB | Facility: HOSPITAL | Age: 34
End: 2019-08-15
Payer: COMMERCIAL

## 2019-08-15 DIAGNOSIS — Z86.32 HISTORY OF INSULIN CONTROLLED GESTATIONAL DIABETES MELLITUS (GDM): ICD-10-CM

## 2019-08-15 DIAGNOSIS — E03.9 HYPOTHYROIDISM, UNSPECIFIED TYPE: ICD-10-CM

## 2019-08-15 LAB
GLUCOSE 2H P 75 G GLC PO SERPL-MCNC: 167 MG/DL (ref 65–139)
GLUCOSE P FAST SERPL-MCNC: 96 MG/DL (ref 65–99)
TSH SERPL DL<=0.05 MIU/L-ACNC: 1.37 UIU/ML (ref 0.36–3.74)

## 2019-08-15 PROCEDURE — 36415 COLL VENOUS BLD VENIPUNCTURE: CPT

## 2019-08-15 PROCEDURE — 82947 ASSAY GLUCOSE BLOOD QUANT: CPT

## 2019-08-15 PROCEDURE — 82950 GLUCOSE TEST: CPT

## 2019-08-15 PROCEDURE — 84443 ASSAY THYROID STIM HORMONE: CPT

## 2019-08-16 DIAGNOSIS — E89.0 POSTOPERATIVE HYPOTHYROIDISM: Chronic | ICD-10-CM

## 2019-08-16 DIAGNOSIS — R73.02 IMPAIRED GLUCOSE TOLERANCE: Primary | ICD-10-CM

## 2019-08-16 RX ORDER — LEVOTHYROXINE SODIUM 0.07 MG/1
75 TABLET ORAL DAILY
Qty: 90 TABLET | Refills: 0 | Status: SHIPPED | OUTPATIENT
Start: 2019-08-16

## 2021-09-08 ENCOUNTER — OFFICE VISIT (OUTPATIENT)
Dept: DENTISTRY | Facility: CLINIC | Age: 36
End: 2021-09-08

## 2021-09-08 VITALS — DIASTOLIC BLOOD PRESSURE: 89 MMHG | SYSTOLIC BLOOD PRESSURE: 130 MMHG | TEMPERATURE: 98.8 F | HEART RATE: 76 BPM

## 2021-09-08 DIAGNOSIS — Z01.20 ENCOUNTER FOR DENTAL EXAM AND CLEANING W/O ABNORMAL FINDINGS: ICD-10-CM

## 2021-09-08 DIAGNOSIS — Z01.20 ENCOUNTER FOR DENTAL EXAMINATION: Primary | ICD-10-CM

## 2021-09-08 PROCEDURE — D0210 INTRAORAL - COMPLETE SERIES OF RADIOGRAPHIC IMAGES: HCPCS

## 2021-09-08 PROCEDURE — D0150 COMPREHENSIVE ORAL EVALUATION - NEW OR ESTABLISHED PATIENT: HCPCS | Performed by: DENTIST

## 2021-09-08 NOTE — PROGRESS NOTES
COMP EXAM, FMX, PROBE EXAM  New patient visit  Pt speaks Sammarinese  Last dental visit approximately 2 + years ago  Pt reports no current dental pain or concerns  CHIEF CONCERN: none   PAIN SCALE: 0  ASA CLASS: I  PLAQUE: mild   CALCULUS:  light calculus   BLEEDING:  light bleeding upon probing  STAIN :none  ORAL HYGIENE:  good  PERIO: probe exam completed  Most all readings within normal limits  Soft tissue exam:  soft tissue exam was normal  ExtraOral exam:   Extraoral exam was normal    Dr Jacqui Vaughan exam=   Reviewed with patient in Sammarinese all clinical and radiographic findings and patient verbalized understanding  All questions and concerns addressed  No bone loss present  Just prophy indicated for next visit  REFERRALS: [facility] referral given for extraction of #1 and #16  #16 decay present       CARIES FINDINGS: #13- MO, #30 DO    Next Visit: fillings  NV: Adult prophy

## 2021-09-13 ENCOUNTER — OFFICE VISIT (OUTPATIENT)
Dept: DENTISTRY | Facility: CLINIC | Age: 36
End: 2021-09-13

## 2021-09-13 VITALS — TEMPERATURE: 98.4 F

## 2021-09-13 DIAGNOSIS — Z01.20 ENCOUNTER FOR DENTAL EXAM AND CLEANING W/O ABNORMAL FINDINGS: Primary | ICD-10-CM

## 2021-09-13 PROCEDURE — D1110 PROPHYLAXIS - ADULT: HCPCS

## 2021-09-13 NOTE — PROGRESS NOTES
ADULT PROPHY    CHIEF CONCERN: none   PAIN SCALE: 0  ASA CLASS: I  PLAQUE: mild  CALCULUS: night calculus   BLEEDING: none   STAIN :none   ORAL HYGIENE:  excellent  PERIO: gingiva appear very healthy     Hand scaled, polished and flossed  Oral Hygiene Instruction :  recommended brushing 2 x daily for 2 minutes MIN, recommended flossing daily, reviewed dietary precautions    soft tissue evaluation    Soft tissue exam:  soft tissue exam was normal  ExtraOral exam:   Extraoral exam was normal    REFERRALS: no referrals needed    Next Visit: fillings   Next Recall: 6 month recall

## 2024-06-18 ENCOUNTER — OFFICE VISIT (OUTPATIENT)
Dept: URGENT CARE | Facility: CLINIC | Age: 39
End: 2024-06-18
Payer: COMMERCIAL

## 2024-06-18 VITALS
DIASTOLIC BLOOD PRESSURE: 69 MMHG | RESPIRATION RATE: 18 BRPM | HEART RATE: 78 BPM | OXYGEN SATURATION: 98 % | TEMPERATURE: 98.8 F | SYSTOLIC BLOOD PRESSURE: 134 MMHG

## 2024-06-18 DIAGNOSIS — R05.1 ACUTE COUGH: Primary | ICD-10-CM

## 2024-06-18 PROCEDURE — G0382 LEV 3 HOSP TYPE B ED VISIT: HCPCS

## 2024-06-18 RX ORDER — BROMPHENIRAMINE MALEATE, PSEUDOEPHEDRINE HYDROCHLORIDE, AND DEXTROMETHORPHAN HYDROBROMIDE 2; 30; 10 MG/5ML; MG/5ML; MG/5ML
10 SYRUP ORAL 4 TIMES DAILY PRN
Qty: 120 ML | Refills: 0 | Status: SHIPPED | OUTPATIENT
Start: 2024-06-18
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
## 2024-12-30 ENCOUNTER — OFFICE VISIT (OUTPATIENT)
Dept: URGENT CARE | Facility: CLINIC | Age: 39
End: 2024-12-30
Payer: COMMERCIAL

## 2024-12-30 VITALS
TEMPERATURE: 97.4 F | SYSTOLIC BLOOD PRESSURE: 120 MMHG | RESPIRATION RATE: 18 BRPM | DIASTOLIC BLOOD PRESSURE: 64 MMHG | OXYGEN SATURATION: 99 % | HEART RATE: 86 BPM

## 2024-12-30 DIAGNOSIS — N39.0 URINARY TRACT INFECTION WITH HEMATURIA, SITE UNSPECIFIED: Primary | ICD-10-CM

## 2024-12-30 DIAGNOSIS — R31.9 URINARY TRACT INFECTION WITH HEMATURIA, SITE UNSPECIFIED: Primary | ICD-10-CM

## 2024-12-30 DIAGNOSIS — R30.9 PAINFUL URINATION: ICD-10-CM

## 2024-12-30 LAB
SL AMB  POCT GLUCOSE, UA: ABNORMAL
SL AMB LEUKOCYTE ESTERASE,UA: ABNORMAL
SL AMB POCT BILIRUBIN,UA: ABNORMAL
SL AMB POCT BLOOD,UA: ABNORMAL
SL AMB POCT CLARITY,UA: CLEAR
SL AMB POCT COLOR,UA: ABNORMAL
SL AMB POCT KETONES,UA: ABNORMAL
SL AMB POCT NITRITE,UA: ABNORMAL
SL AMB POCT PH,UA: 5
SL AMB POCT SPECIFIC GRAVITY,UA: 1.02
SL AMB POCT URINE PROTEIN: ABNORMAL
SL AMB POCT UROBILINOGEN: 0.2

## 2024-12-30 PROCEDURE — 87077 CULTURE AEROBIC IDENTIFY: CPT

## 2024-12-30 PROCEDURE — 87086 URINE CULTURE/COLONY COUNT: CPT

## 2024-12-30 PROCEDURE — 81002 URINALYSIS NONAUTO W/O SCOPE: CPT

## 2024-12-30 PROCEDURE — 99213 OFFICE O/P EST LOW 20 MIN: CPT

## 2024-12-30 PROCEDURE — 87186 SC STD MICRODIL/AGAR DIL: CPT

## 2024-12-30 RX ORDER — SULFAMETHOXAZOLE AND TRIMETHOPRIM 800; 160 MG/1; MG/1
1 TABLET ORAL EVERY 12 HOURS SCHEDULED
Qty: 20 TABLET | Refills: 0 | Status: SHIPPED | OUTPATIENT
Start: 2024-12-30 | End: 2025-01-09
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
## 2025-01-01 LAB — BACTERIA UR CULT: ABNORMAL

## 2025-01-02 ENCOUNTER — TELEPHONE (OUTPATIENT)
Dept: URGENT CARE | Facility: CLINIC | Age: 40
End: 2025-01-02

## 2025-01-02 DIAGNOSIS — N39.0 URINARY TRACT INFECTION WITHOUT HEMATURIA, SITE UNSPECIFIED: Primary | ICD-10-CM

## 2025-01-02 RX ORDER — CEPHALEXIN 500 MG/1
500 CAPSULE ORAL EVERY 12 HOURS SCHEDULED
Qty: 14 CAPSULE | Refills: 0 | Status: SHIPPED | OUTPATIENT
Start: 2025-01-02 | End: 2025-01-09

## 2025-01-02 NOTE — TELEPHONE ENCOUNTER
Urine culture-positive for E. coli.    Patient was currently taking Bactrim and reports very minimal relief.  Bactrim is resistant to E. coli.  Patient was told we will send in Keflex.  Take 1 tablet every 12 hours for 7 days.  Discontinue Bactrim